# Patient Record
Sex: FEMALE | Race: WHITE | NOT HISPANIC OR LATINO | Employment: FULL TIME | URBAN - METROPOLITAN AREA
[De-identification: names, ages, dates, MRNs, and addresses within clinical notes are randomized per-mention and may not be internally consistent; named-entity substitution may affect disease eponyms.]

---

## 2020-08-05 ENCOUNTER — OFFICE VISIT (OUTPATIENT)
Dept: HEMATOLOGY/ONCOLOGY | Facility: CLINIC | Age: 40
End: 2020-08-05
Payer: COMMERCIAL

## 2020-08-05 ENCOUNTER — LAB VISIT (OUTPATIENT)
Dept: LAB | Facility: HOSPITAL | Age: 40
End: 2020-08-05
Attending: INTERNAL MEDICINE
Payer: COMMERCIAL

## 2020-08-05 VITALS
SYSTOLIC BLOOD PRESSURE: 127 MMHG | OXYGEN SATURATION: 99 % | HEART RATE: 88 BPM | WEIGHT: 236.13 LBS | TEMPERATURE: 98 F | DIASTOLIC BLOOD PRESSURE: 86 MMHG | RESPIRATION RATE: 16 BRPM | BODY MASS INDEX: 43.45 KG/M2 | HEIGHT: 62 IN

## 2020-08-05 DIAGNOSIS — D50.9 IRON DEFICIENCY ANEMIA, UNSPECIFIED IRON DEFICIENCY ANEMIA TYPE: ICD-10-CM

## 2020-08-05 DIAGNOSIS — D50.9 IRON DEFICIENCY ANEMIA, UNSPECIFIED IRON DEFICIENCY ANEMIA TYPE: Primary | ICD-10-CM

## 2020-08-05 DIAGNOSIS — Z98.84 HISTORY OF ROUX-EN-Y GASTRIC BYPASS: ICD-10-CM

## 2020-08-05 PROCEDURE — 99204 PR OFFICE/OUTPT VISIT, NEW, LEVL IV, 45-59 MIN: ICD-10-PCS | Mod: S$GLB,,, | Performed by: INTERNAL MEDICINE

## 2020-08-05 PROCEDURE — 83540 ASSAY OF IRON: CPT

## 2020-08-05 PROCEDURE — 99999 PR PBB SHADOW E&M-NEW PATIENT-LVL IV: CPT | Mod: PBBFAC,,, | Performed by: INTERNAL MEDICINE

## 2020-08-05 PROCEDURE — 3008F BODY MASS INDEX DOCD: CPT | Mod: CPTII,S$GLB,, | Performed by: INTERNAL MEDICINE

## 2020-08-05 PROCEDURE — 99204 OFFICE O/P NEW MOD 45 MIN: CPT | Mod: S$GLB,,, | Performed by: INTERNAL MEDICINE

## 2020-08-05 PROCEDURE — 99999 PR PBB SHADOW E&M-NEW PATIENT-LVL IV: ICD-10-PCS | Mod: PBBFAC,,, | Performed by: INTERNAL MEDICINE

## 2020-08-05 PROCEDURE — 82728 ASSAY OF FERRITIN: CPT

## 2020-08-05 PROCEDURE — 36415 COLL VENOUS BLD VENIPUNCTURE: CPT

## 2020-08-05 PROCEDURE — 3008F PR BODY MASS INDEX (BMI) DOCUMENTED: ICD-10-PCS | Mod: CPTII,S$GLB,, | Performed by: INTERNAL MEDICINE

## 2020-08-05 PROCEDURE — 85025 COMPLETE CBC W/AUTO DIFF WBC: CPT

## 2020-08-05 RX ORDER — BUPROPION HYDROCHLORIDE 300 MG/1
300 TABLET ORAL
COMMUNITY

## 2020-08-05 RX ORDER — SODIUM CHLORIDE 0.9 % (FLUSH) 0.9 %
10 SYRINGE (ML) INJECTION
Status: CANCELLED | OUTPATIENT
Start: 2020-08-19

## 2020-08-05 RX ORDER — FLUOXETINE HYDROCHLORIDE 40 MG/1
40 CAPSULE ORAL
COMMUNITY

## 2020-08-05 RX ORDER — HEPARIN 100 UNIT/ML
500 SYRINGE INTRAVENOUS
Status: CANCELLED | OUTPATIENT
Start: 2020-08-19

## 2020-08-05 RX ORDER — METHOCARBAMOL 750 MG/1
750 TABLET, FILM COATED ORAL
COMMUNITY

## 2020-08-05 RX ORDER — HEPARIN 100 UNIT/ML
500 SYRINGE INTRAVENOUS
Status: CANCELLED | OUTPATIENT
Start: 2020-08-12

## 2020-08-05 RX ORDER — SODIUM CHLORIDE 0.9 % (FLUSH) 0.9 %
10 SYRINGE (ML) INJECTION
Status: CANCELLED | OUTPATIENT
Start: 2020-08-12

## 2020-08-05 RX ORDER — GABAPENTIN 800 MG/1
800 TABLET ORAL
COMMUNITY

## 2020-08-05 NOTE — PROGRESS NOTES
Subjective:      DATE OF VISIT: 8/5/20     ?  Patient ID:?Mariann Arias is a 40 y.o. female.?? MR#: [id number]   ?   REFERRING PROVIDER: Aaareferral Self  No address on file     ? Primary Care Providers:  Primary Doctor No (General)     CHIEF COMPLAINT: ?  Iron deficiency anemia??   ?   HPI    Today the pleasure meeting for the 1st time Ms. Arias, a a 40-year-old woman status post Binu-en-Y gastric bypass surgery in 2005.  Since then she has been on supplements including po iron without improvement and has required IV iron every couple years, last about 4 years ago at outside facility.  She has noted recurrent fatigue and Pica for ice suspecting recurrent iron deficiency anemia.  She denies menorrhagia, melena, hematochezia any dysuria can be hemoptysis, hematemesis, reflux disease.  She does have recurrent kidney stones and chronic back pain status post orthopedic surgery in 2017/2018.    Review of Systems    ?   A comprehensive 14-point review of systems was reviewed with patient and was negative other than as specified above.   ?   PAST MEDICAL HISTORY:   History reviewed. No pertinent past medical history. ?     PAST SURGICAL HISTORY:   History reviewed. No pertinent surgical history.   ?   ALLERGIES:   Allergies as of 08/05/2020    (No Known Allergies)      ?   MEDICATIONS:?   Outpatient Medications Marked as Taking for the 8/5/20 encounter (Office Visit) with Misty Mayers MD   Medication Sig Dispense Refill    buPROPion (WELLBUTRIN XL) 300 MG 24 hr tablet Take 300 mg by mouth.      FLUoxetine 40 MG capsule Take 40 mg by mouth.      gabapentin (NEURONTIN) 800 MG tablet Take 800 mg by mouth.      methocarbamoL (ROBAXIN) 750 MG Tab Take 750 mg by mouth.        ?   SOCIAL HISTORY:?   Social History     Tobacco Use    Smoking status: Not on file   Substance Use Topics    Alcohol use: Not on file      ?      ?   FAMILY HISTORY:   family history is not on file.   ?   Paternal grandmother with ovarian  cancer in 80s.      Objective:      Physical Exam      ?   Vitals:    08/05/20 1549   BP: 127/86   Pulse: 88   Resp: 16   Temp: 98 °F (36.7 °C)      ?   ECOG:?0   General appearance: Generally well appearing, in no acute distress.   Head, eyes, ears, nose, and throat: moist mucous membranes.   Respiratory:  Normal work of breathing  Abdomen:  Obese, nontender  Extremities: Warm, without edema.   Neurologic: Alert and oriented. Grossly normal strength, coordination, and gait.   Skin: No rashes, ecchymoses or petechial lesion.   Psychiatric:  Normal mood and affect.    ?   Laboratory:  ?   Lab Visit on 08/05/2020   Component Date Value Ref Range Status    WBC 08/05/2020 5.28  3.90 - 12.70 K/uL Final    RBC 08/05/2020 4.30  4.00 - 5.40 M/uL Final    Hemoglobin 08/05/2020 8.6* 12.0 - 16.0 g/dL Final    Hematocrit 08/05/2020 30.1* 37.0 - 48.5 % Final    Mean Corpuscular Volume 08/05/2020 70* 82 - 98 fL Final    Mean Corpuscular Hemoglobin 08/05/2020 20.0* 27.0 - 31.0 pg Final    Mean Corpuscular Hemoglobin Conc 08/05/2020 28.6* 32.0 - 36.0 g/dL Final    RDW 08/05/2020 17.6* 11.5 - 14.5 % Final    Platelets 08/05/2020 298  150 - 350 K/uL Final    MPV 08/05/2020 11.0  9.2 - 12.9 fL Final    Immature Granulocytes 08/05/2020 0.6* 0.0 - 0.5 % Final    Gran # (ANC) 08/05/2020 3.2  1.8 - 7.7 K/uL Final    Immature Grans (Abs) 08/05/2020 0.03  0.00 - 0.04 K/uL Final    Lymph # 08/05/2020 1.5  1.0 - 4.8 K/uL Final    Mono # 08/05/2020 0.5  0.3 - 1.0 K/uL Final    Eos # 08/05/2020 0.1  0.0 - 0.5 K/uL Final    Baso # 08/05/2020 0.02  0.00 - 0.20 K/uL Final    nRBC 08/05/2020 0  0 /100 WBC Final    Gran% 08/05/2020 59.9  38.0 - 73.0 % Final    Lymph% 08/05/2020 27.5  18.0 - 48.0 % Final    Mono% 08/05/2020 8.9  4.0 - 15.0 % Final    Eosinophil% 08/05/2020 2.7  0.0 - 8.0 % Final    Basophil% 08/05/2020 0.4  0.0 - 1.9 % Final    Differential Method 08/05/2020 Automated   Final          ?   Assessment/Plan:        1. Iron deficiency anemia, unspecified iron deficiency anemia type    2. History of Binu-en-Y gastric bypass          Plan:     # iron deficiency anemia:  Recurrent iron deficiency anemia requiring IV iron in the setting of gastric bypass surgery 2005 with recurrent symptomatic anemia with fatigue and Pica.  Labs confirm severe microcytic anemia with hemoglobin 8.6, MCV 70.  Iron indices pending. I did recommend repeat IV iron which she tolerated well in the past.  I discussed risks and benefits of IV iron including limited use in early pregnancy (denies risk of pregnancy and defers pregnancy exam due to ongoing oral contraceptive use and not sexually active), and risk of allergy/anaphylaxis.  She is in agreement with proceeding with IV iron at this time.  Given her history of malabsorption in the setting of Binu-en-Y gastric bypass in the absence of other concerning symptoms for GI blood loss, history of IBS or colorectal malignancy history in family or other blood loss to indicate early colonoscopy/EGD but encouraged her to let us know if any concerns for this arise in the future.     Follow-Up:   Labs today  IV iron Feraheme x2 doses 1 week apart  Revisit in 3 months with labs 1 week prior, tele video visit maty

## 2020-08-05 NOTE — PATIENT INSTRUCTIONS
Labs today  Iv iron feraheme x 2 doses 1 week apart  RV in 3 months with 1 week prior televideo please

## 2020-08-06 LAB
BASOPHILS # BLD AUTO: 0.02 K/UL (ref 0–0.2)
BASOPHILS NFR BLD: 0.4 % (ref 0–1.9)
DIFFERENTIAL METHOD: ABNORMAL
EOSINOPHIL # BLD AUTO: 0.1 K/UL (ref 0–0.5)
EOSINOPHIL NFR BLD: 2.7 % (ref 0–8)
ERYTHROCYTE [DISTWIDTH] IN BLOOD BY AUTOMATED COUNT: 17.6 % (ref 11.5–14.5)
FERRITIN SERPL-MCNC: 4 NG/ML (ref 20–300)
HCT VFR BLD AUTO: 30.1 % (ref 37–48.5)
HGB BLD-MCNC: 8.6 G/DL (ref 12–16)
IMM GRANULOCYTES # BLD AUTO: 0.03 K/UL (ref 0–0.04)
IMM GRANULOCYTES NFR BLD AUTO: 0.6 % (ref 0–0.5)
IRON SERPL-MCNC: 13 UG/DL (ref 30–160)
LYMPHOCYTES # BLD AUTO: 1.5 K/UL (ref 1–4.8)
LYMPHOCYTES NFR BLD: 27.5 % (ref 18–48)
MCH RBC QN AUTO: 20 PG (ref 27–31)
MCHC RBC AUTO-ENTMCNC: 28.6 G/DL (ref 32–36)
MCV RBC AUTO: 70 FL (ref 82–98)
MONOCYTES # BLD AUTO: 0.5 K/UL (ref 0.3–1)
MONOCYTES NFR BLD: 8.9 % (ref 4–15)
NEUTROPHILS # BLD AUTO: 3.2 K/UL (ref 1.8–7.7)
NEUTROPHILS NFR BLD: 59.9 % (ref 38–73)
NRBC BLD-RTO: 0 /100 WBC
PLATELET # BLD AUTO: 298 K/UL (ref 150–350)
PMV BLD AUTO: 11 FL (ref 9.2–12.9)
RBC # BLD AUTO: 4.3 M/UL (ref 4–5.4)
SATURATED IRON: 2 % (ref 20–50)
TOTAL IRON BINDING CAPACITY: 558 UG/DL (ref 250–450)
TRANSFERRIN SERPL-MCNC: 377 MG/DL (ref 200–375)
WBC # BLD AUTO: 5.28 K/UL (ref 3.9–12.7)

## 2020-08-12 ENCOUNTER — INFUSION (OUTPATIENT)
Dept: INFUSION THERAPY | Facility: HOSPITAL | Age: 40
End: 2020-08-12
Attending: INTERNAL MEDICINE
Payer: COMMERCIAL

## 2020-08-12 VITALS
RESPIRATION RATE: 16 BRPM | TEMPERATURE: 98 F | OXYGEN SATURATION: 99 % | SYSTOLIC BLOOD PRESSURE: 120 MMHG | DIASTOLIC BLOOD PRESSURE: 70 MMHG | HEART RATE: 70 BPM

## 2020-08-12 DIAGNOSIS — D50.9 IRON DEFICIENCY ANEMIA, UNSPECIFIED IRON DEFICIENCY ANEMIA TYPE: Primary | ICD-10-CM

## 2020-08-12 PROCEDURE — 96374 THER/PROPH/DIAG INJ IV PUSH: CPT

## 2020-08-12 PROCEDURE — 63600175 PHARM REV CODE 636 W HCPCS: Mod: JG | Performed by: INTERNAL MEDICINE

## 2020-08-12 PROCEDURE — 25000003 PHARM REV CODE 250: Performed by: INTERNAL MEDICINE

## 2020-08-12 RX ORDER — SODIUM CHLORIDE 0.9 % (FLUSH) 0.9 %
10 SYRINGE (ML) INJECTION
Status: DISCONTINUED | OUTPATIENT
Start: 2020-08-12 | End: 2020-08-12 | Stop reason: HOSPADM

## 2020-08-12 RX ADMIN — FERUMOXYTOL 510 MG: 510 INJECTION INTRAVENOUS at 01:08

## 2020-08-12 NOTE — DISCHARGE INSTRUCTIONS
[facility]  [address] [facility] [address] [facility] [address]  [address]  [phone] phone     [phone] fax  Hours of Operation: Monday- Friday 8:00am- 5:00pm  After hours phone  [phone]  Hematology / Oncology Physicians on call      EMELINA Victor Dr., Dr., Dr., Dr., NP Sydney Prescott, NP Tyesha Taylor, NP    Please call with any concerns regarding your appointment today.    IRON DEFICIENCY ANEMIA:  Anemia is a condition where the size or number of red blood cells in the body is reduced. Iron is needed in the diet to make red cells. The red blood cells carry oxygen to all parts of the body. Anemia limits the delivery of oxygen to where it is needed. This causes a feeling of being tired and run down. When anemia becomes severe, the skin becomes pale and there is shortness of breath with exertion, headaches, dizziness, drowsiness and fatigue.  The cause of your anemia is lack of iron in your body. This may occur due to blood loss (for example, heavy menstrual periods or bleeding from the stomach or intestines) or a poor diet (not eating enough iron-containing foods), inability to absorb iron from your diet, or pregnancy.  If the blood count is low enough, an IRON SUPPLEMENT will be prescribed. It usually takes about 2-3 months of treatment with iron supplements to correct an anemia. Severe cases of anemia requires a blood transfusion to rapidly correct symptoms and deliver more oxygen to the cells.  HOME CARE:  1) Increase the iron stores in your body by eating foods high in iron content. This is a natural way of building your blood cells back up again. Beef, liver, spinach and other dark green leafy vegetables, whole grain products, beans and nuts are all natural sources of iron.  2) If you are having symptoms of anemia listed above:  -- Do not overexert yourself.  -- Talk to your doctor before flying on an airplane or  travelling to high altitudes.  FOLLOW UP with your doctor in 2 months for a repeat red blood cell count, or as recommended by our staff, to be sure that the anemia has been corrected.  GET PROMPT MEDICAL ATTENTION if any of the following occur or worsen:  -- Shortness of breath or chest pain  -- Dizziness or fainting  -- Vomiting blood or passing red or black-colored stool  © 2000-2011 [facility], [address] [place] [address], [place] [address]. All rights reserved. This information is not intended as a substitute for professional medical care. Always follow your healthcare professional's instructions.    Ferumoxytol Solution for injection  What is this medicine?  FERUMOXYTOL is an iron complex. Iron is used to make healthy red blood cells, which carry oxygen and nutrients throughout the body. This medicine is used to treat iron deficiency anemia in people with chronic kidney disease.  This medicine may be used for other purposes; ask your health care provider or pharmacist if you have questions.  What should I tell my health care provider before I take this medicine?  They need to know if you have any of these conditions:  · anemia not caused by low iron levels  · high levels of iron in the blood  · magnetic resonance imaging (MRI) test scheduled  · an unusual or allergic reaction to iron, other medicines, foods, dyes, or preservatives  · pregnant or trying to get pregnant  · breast-feeding  How should I use this medicine?  This medicine is for infusion into a vein. It is given by a health care professional in a hospital or clinic setting.  Talk to your pediatrician regarding the use of this medicine in children. Special care may be needed.  Overdosage: If you think you've taken too much of this medicine contact a poison control center or emergency room at once.  NOTE: This medicine is only for you. Do not share this medicine with others.  What if I miss a dose?  It is important not to miss your dose. Call your  doctor or health care professional if you are unable to keep an appointment.  What may interact with this medicine?  This medicine may interact with the following medications:  · other iron products  This list may not describe all possible interactions. Give your health care provider a list of all the medicines, herbs, non-prescription drugs, or dietary supplements you use. Also tell them if you smoke, drink alcohol, or use illegal drugs. Some items may interact with your medicine.  What should I watch for while using this medicine?  Visit your doctor or healthcare professional regularly. Tell your doctor or healthcare professional if your symptoms do not start to get better or if they get worse. You may need blood work done while you are taking this medicine.  You may need to follow a special diet. Talk to your doctor. Foods that contain iron include: whole grains/cereals, dried fruits, beans, or peas, leafy green vegetables, and organ meats (liver, kidney).  What side effects may I notice from receiving this medicine?  Side effects that you should report to your doctor or health care professional as soon as possible:  · allergic reactions like skin rash, itching or hives, swelling of the face, lips, or tongue  · breathing problems  · changes in blood pressure  · feeling faint or lightheaded, falls  · fever or chills  · flushing, sweating, or hot feelings  · swelling of the ankles or feet  Side effects that usually do not require medical attention (Report these to your doctor or health care professional if they continue or are bothersome.):  · diarrhea  · headache  · nausea, vomiting  · stomach pain  This list may not describe all possible side effects. Call your doctor for medical advice about side effects. You may report side effects to FDA at 9-084-FDA-4569.  Where should I keep my medicine?  This drug is given in a hospital or clinic and will not be stored at home.  NOTE: This sheet is a summary. It may not  cover all possible information. If you have questions about this medicine, talk to your doctor, pharmacist, or health care provider.  NOTE:This sheet is a summary. It may not cover all possible information. If you have questions about this medicine, talk to your doctor, pharmacist, or health care provider. Copyright© 2011 [employer]

## 2020-08-19 ENCOUNTER — INFUSION (OUTPATIENT)
Dept: INFUSION THERAPY | Facility: HOSPITAL | Age: 40
End: 2020-08-19
Attending: INTERNAL MEDICINE
Payer: COMMERCIAL

## 2020-08-19 VITALS
DIASTOLIC BLOOD PRESSURE: 66 MMHG | OXYGEN SATURATION: 99 % | SYSTOLIC BLOOD PRESSURE: 105 MMHG | TEMPERATURE: 98 F | HEART RATE: 71 BPM | RESPIRATION RATE: 17 BRPM

## 2020-08-19 DIAGNOSIS — D50.9 IRON DEFICIENCY ANEMIA, UNSPECIFIED IRON DEFICIENCY ANEMIA TYPE: Primary | ICD-10-CM

## 2020-08-19 PROCEDURE — 96365 THER/PROPH/DIAG IV INF INIT: CPT

## 2020-08-19 PROCEDURE — 25000003 PHARM REV CODE 250: Performed by: INTERNAL MEDICINE

## 2020-08-19 PROCEDURE — 63600175 PHARM REV CODE 636 W HCPCS: Mod: JG | Performed by: INTERNAL MEDICINE

## 2020-08-19 RX ORDER — CEPHALEXIN 500 MG/1
CAPSULE ORAL
COMMUNITY
Start: 2020-08-11 | End: 2020-10-02

## 2020-08-19 RX ADMIN — FERUMOXYTOL 510 MG: 510 INJECTION INTRAVENOUS at 01:08

## 2020-08-19 NOTE — DISCHARGE INSTRUCTIONS
[facility] [place]  [address] [address] [address]  [address]  [phone] phone     [phone] fax  Hours of Operation: Monday- Friday 8:00am- 5:00pm  After hours phone  [phone]  Hematology / Oncology Physicians on call      EMELINA De León Dr., Dr., Dr., Dr., NP Cheree Bodden, NP Sydney Prescott, NP      Please call with any concerns regarding your appointment today.

## 2020-09-15 ENCOUNTER — OFFICE VISIT (OUTPATIENT)
Dept: FAMILY MEDICINE | Facility: CLINIC | Age: 40
End: 2020-09-15

## 2020-09-15 VITALS
HEIGHT: 62 IN | OXYGEN SATURATION: 99 % | HEART RATE: 82 BPM | TEMPERATURE: 99 F | SYSTOLIC BLOOD PRESSURE: 124 MMHG | BODY MASS INDEX: 41.24 KG/M2 | DIASTOLIC BLOOD PRESSURE: 70 MMHG | WEIGHT: 224.13 LBS

## 2020-09-15 DIAGNOSIS — R10.9 RIGHT FLANK PAIN: Primary | ICD-10-CM

## 2020-09-15 DIAGNOSIS — Z98.84 STATUS POST GASTRIC BYPASS FOR OBESITY: ICD-10-CM

## 2020-09-15 DIAGNOSIS — D50.9 IRON DEFICIENCY ANEMIA, UNSPECIFIED IRON DEFICIENCY ANEMIA TYPE: ICD-10-CM

## 2020-09-15 LAB
BILIRUB SERPL-MCNC: 2 MG/DL
BLOOD URINE, POC: 500
CLARITY, POC UA: ABNORMAL
COLOR, POC UA: YELLOW
GLUCOSE UR QL STRIP: NORMAL
KETONES UR QL STRIP: NEGATIVE
LEUKOCYTE ESTERASE URINE, POC: 2
NITRITE, POC UA: NEGATIVE
PH, POC UA: 5
PROTEIN, POC: ABNORMAL
SPECIFIC GRAVITY, POC UA: 1.02
UROBILINOGEN, POC UA: NORMAL

## 2020-09-15 PROCEDURE — 99204 OFFICE O/P NEW MOD 45 MIN: CPT | Mod: S$PBB,,, | Performed by: FAMILY MEDICINE

## 2020-09-15 PROCEDURE — 99204 PR OFFICE/OUTPT VISIT, NEW, LEVL IV, 45-59 MIN: ICD-10-PCS | Mod: S$PBB,,, | Performed by: FAMILY MEDICINE

## 2020-09-15 PROCEDURE — 96372 THER/PROPH/DIAG INJ SC/IM: CPT | Mod: PBBFAC,PO

## 2020-09-15 PROCEDURE — 99999 PR PBB SHADOW E&M-EST. PATIENT-LVL IV: CPT | Mod: PBBFAC,,, | Performed by: FAMILY MEDICINE

## 2020-09-15 PROCEDURE — 99214 OFFICE O/P EST MOD 30 MIN: CPT | Mod: PBBFAC,PO,25 | Performed by: FAMILY MEDICINE

## 2020-09-15 PROCEDURE — 81002 URINALYSIS NONAUTO W/O SCOPE: CPT | Mod: PBBFAC,PO | Performed by: FAMILY MEDICINE

## 2020-09-15 PROCEDURE — 99999 PR PBB SHADOW E&M-EST. PATIENT-LVL IV: ICD-10-PCS | Mod: PBBFAC,,, | Performed by: FAMILY MEDICINE

## 2020-09-15 RX ORDER — TAMSULOSIN HYDROCHLORIDE 0.4 MG/1
0.4 CAPSULE ORAL DAILY
Qty: 30 CAPSULE | Refills: 0 | Status: SHIPPED | OUTPATIENT
Start: 2020-09-15 | End: 2020-10-15

## 2020-09-15 RX ORDER — ONDANSETRON 4 MG/1
4 TABLET, ORALLY DISINTEGRATING ORAL EVERY 6 HOURS PRN
Qty: 20 TABLET | Refills: 0 | Status: SHIPPED | OUTPATIENT
Start: 2020-09-15 | End: 2020-10-02

## 2020-09-15 RX ORDER — HYDROCODONE BITARTRATE AND ACETAMINOPHEN 7.5; 325 MG/1; MG/1
1 TABLET ORAL EVERY 6 HOURS PRN
Qty: 12 TABLET | Refills: 0 | Status: SHIPPED | OUTPATIENT
Start: 2020-09-15 | End: 2020-10-02

## 2020-09-15 RX ORDER — KETOROLAC TROMETHAMINE 30 MG/ML
30 INJECTION, SOLUTION INTRAMUSCULAR; INTRAVENOUS ONCE
Status: COMPLETED | OUTPATIENT
Start: 2020-09-15 | End: 2020-09-15

## 2020-09-15 RX ADMIN — KETOROLAC TROMETHAMINE 30 MG: 60 INJECTION, SOLUTION INTRAMUSCULAR at 04:09

## 2020-09-15 NOTE — PROGRESS NOTES
Subjective:       Patient ID: Mariann Arias is a 40 y.o. female.    Chief Complaint: Nephrolithiasis (poss)      HPI Comments:       Current Outpatient Medications:     buPROPion (WELLBUTRIN XL) 300 MG 24 hr tablet, Take 300 mg by mouth., Disp: , Rfl:     cephALEXin (KEFLEX) 500 MG capsule, TK 2 CS PO BID, Disp: , Rfl:     FLUoxetine 40 MG capsule, Take 40 mg by mouth., Disp: , Rfl:     gabapentin (NEURONTIN) 800 MG tablet, Take 800 mg by mouth., Disp: , Rfl:     HYDROcodone-acetaminophen (NORCO) 7.5-325 mg per tablet, Take 1 tablet by mouth every 6 (six) hours as needed for Pain., Disp: 12 tablet, Rfl: 0    methocarbamoL (ROBAXIN) 750 MG Tab, Take 750 mg by mouth., Disp: , Rfl:     ondansetron (ZOFRAN-ODT) 4 MG TbDL, Take 1 tablet (4 mg total) by mouth every 6 (six) hours as needed., Disp: 20 tablet, Rfl: 0    tamsulosin (FLOMAX) 0.4 mg Cap, Take 1 capsule (0.4 mg total) by mouth once daily., Disp: 30 capsule, Rfl: 0  No current facility-administered medications for this visit.       This my 1st time seeing this patient.  She has a history of recurrent stones over the last several years.  Recently moved here from [place] because of a job.  Has no insurance.  Never had lithotripsy of surgery.  Stent x1.  Generally the past spontaneously.  In the past has been helped by IM Toradol and Flomax.  Also uses Suboxone because of a dependency on hydrocodone back when she had a back injury and surgery.  Has been using this as well as ibuprofen and heat since onset yesterday.  No blood in urine.  Some chills.  Decreased urine volume since yesterday pain still is persistent in the flank area    All stones have been calcium oxalate.  Tried dietary approaches in the past to no avail    Review of Systems   Constitutional: Positive for chills. Negative for activity change, appetite change and fever.   HENT: Negative for sore throat.    Respiratory: Negative for cough and shortness of breath.    Cardiovascular: Negative for  "chest pain.   Gastrointestinal: Positive for nausea. Negative for abdominal pain and diarrhea.   Genitourinary: Positive for decreased urine volume, difficulty urinating and flank pain. Negative for dysuria and hematuria.   Musculoskeletal: Positive for back pain. Negative for arthralgias and myalgias.   Neurological: Negative for dizziness and headaches.       Objective:      Vitals:    09/15/20 1523   BP: 124/70   Pulse: 82   Temp: 98.6 °F (37 °C)   TempSrc: Temporal   SpO2: 99%   Weight: 101.6 kg (224 lb 1.6 oz)   Height: 5' 2" (1.575 m)   PainSc:   3   PainLoc: Rib Cage     Physical Exam  Vitals signs and nursing note reviewed.   Constitutional:       General: She is not in acute distress.     Appearance: She is well-developed. She is not diaphoretic.   HENT:      Head: Normocephalic.   Neck:      Musculoskeletal: Neck supple.      Thyroid: No thyromegaly.   Cardiovascular:      Rate and Rhythm: Normal rate and regular rhythm.      Heart sounds: Normal heart sounds. No murmur.   Pulmonary:      Effort: Pulmonary effort is normal.      Breath sounds: Normal breath sounds. No wheezing or rales.   Abdominal:      General: There is no distension.      Palpations: Abdomen is soft. There is no hepatomegaly or splenomegaly.      Tenderness: There is no right CVA tenderness or left CVA tenderness.       Lymphadenopathy:      Cervical: No cervical adenopathy.   Skin:     General: Skin is warm and dry.   Neurological:      Mental Status: She is alert and oriented to person, place, and time.   Psychiatric:         Behavior: Behavior normal.         Thought Content: Thought content normal.         Judgment: Judgment normal.         Assessment:       1. Right flank pain    2. Status post gastric bypass for obesity    3. Iron deficiency anemia, unspecified iron deficiency anemia type        Plan:   Right flank pain  Comments:  Urinalysis consistent with nephrolithiasis.  Toradol IM 30 mg.  Very limited hydrocodone #12.  " Zofran.  Flomax.  Oral hydration  Orders:  -     Urine culture; Future; Expected date: 09/15/2020  -     POCT URINE DIPSTICK WITHOUT MICROSCOPE  -     ketorolac injection 30 mg    Status post gastric bypass for obesity    Iron deficiency anemia, unspecified iron deficiency anemia type  Comments:  Likely secondary to #2  On infusions    Other orders  -     tamsulosin (FLOMAX) 0.4 mg Cap; Take 1 capsule (0.4 mg total) by mouth once daily.  Dispense: 30 capsule; Refill: 0  -     HYDROcodone-acetaminophen (NORCO) 7.5-325 mg per tablet; Take 1 tablet by mouth every 6 (six) hours as needed for Pain.  Dispense: 12 tablet; Refill: 0  -     ondansetron (ZOFRAN-ODT) 4 MG TbDL; Take 1 tablet (4 mg total) by mouth every 6 (six) hours as needed.  Dispense: 20 tablet; Refill: 0

## 2020-09-18 ENCOUNTER — PATIENT MESSAGE (OUTPATIENT)
Dept: FAMILY MEDICINE | Facility: CLINIC | Age: 40
End: 2020-09-18

## 2020-09-18 ENCOUNTER — TELEPHONE (OUTPATIENT)
Dept: FAMILY MEDICINE | Facility: CLINIC | Age: 40
End: 2020-09-18

## 2020-09-18 DIAGNOSIS — Z87.442 HISTORY OF KIDNEY STONES: Primary | ICD-10-CM

## 2020-09-18 NOTE — TELEPHONE ENCOUNTER
----- Message from Mony Gonzalez sent at 9/18/2020  8:52 AM CDT -----  Contact: SELF/[phone]  Would like to consult with nurse regarding a kidney stone. Please call back at [phone]. Thanks/ar

## 2020-09-23 ENCOUNTER — TELEPHONE (OUTPATIENT)
Dept: FAMILY MEDICINE | Facility: CLINIC | Age: 40
End: 2020-09-23

## 2020-09-23 NOTE — TELEPHONE ENCOUNTER
----- Message from Monserrat Chavez sent at 9/23/2020  9:52 AM CDT -----  Contact: pt  Pt requesting a call back to know if she can get a work excuse for Monday and Tuesday. She also stated that she passed another kidney stone. Please call pt back at [phone]

## 2020-09-23 NOTE — TELEPHONE ENCOUNTER
S/w pt. Pt stated that she passed another kidney stone Monday and she was unable to got to work on Monday and yesterday but she did return to work today. Pt is requesting an excuse to be emailed to her at [email or web address]. informed that I will email excuse shortly. Pt verbalized understanding. Excuse emailed.

## 2020-09-23 NOTE — LETTER
September 23, 2020    Mariann Arias  [address]  [address]  [facility] [address]             [facility]  Family Medicine  [address] [facility] [address]  Phone: [phone]  Fax: [phone]   September 23, 2020     Patient: Mariann Arias   YOB: 1980   Date of Visit: 9/23/2020       To Whom it May Concern:    Please excuse Mariann Arias from work on 9/21/2020 and 9/22/2020. She may return to work on 9/23/2020.    Please excuse her from any classes or work missed.    If you have any questions or concerns, please don't hesitate to call.    Sincerely,         Car Alvarez MD/Dania Rasheed LPN

## 2020-10-02 ENCOUNTER — OFFICE VISIT (OUTPATIENT)
Dept: OBSTETRICS AND GYNECOLOGY | Facility: CLINIC | Age: 40
End: 2020-10-02
Payer: COMMERCIAL

## 2020-10-02 VITALS
WEIGHT: 223.75 LBS | BODY MASS INDEX: 40.93 KG/M2 | SYSTOLIC BLOOD PRESSURE: 122 MMHG | DIASTOLIC BLOOD PRESSURE: 84 MMHG

## 2020-10-02 DIAGNOSIS — Z01.419 PAPANICOLAOU SMEAR, AS PART OF ROUTINE GYNECOLOGICAL EXAMINATION: ICD-10-CM

## 2020-10-02 DIAGNOSIS — Z00.00 PREVENTATIVE HEALTH CARE: Primary | ICD-10-CM

## 2020-10-02 PROCEDURE — 99999 PR PBB SHADOW E&M-EST. PATIENT-LVL III: CPT | Mod: PBBFAC,,, | Performed by: NURSE PRACTITIONER

## 2020-10-02 PROCEDURE — 88175 CYTOPATH C/V AUTO FLUID REDO: CPT

## 2020-10-02 PROCEDURE — 99999 PR PBB SHADOW E&M-EST. PATIENT-LVL III: ICD-10-PCS | Mod: PBBFAC,,, | Performed by: NURSE PRACTITIONER

## 2020-10-02 PROCEDURE — 99385 PREV VISIT NEW AGE 18-39: CPT | Mod: S$GLB,,, | Performed by: NURSE PRACTITIONER

## 2020-10-02 PROCEDURE — 87624 HPV HI-RISK TYP POOLED RSLT: CPT

## 2020-10-02 PROCEDURE — 99385 PR PREVENTIVE VISIT,NEW,18-39: ICD-10-PCS | Mod: S$GLB,,, | Performed by: NURSE PRACTITIONER

## 2020-10-02 RX ORDER — IBUPROFEN 800 MG/1
800 TABLET ORAL 3 TIMES DAILY
COMMUNITY

## 2020-10-02 RX ORDER — NORETHINDRONE ACETATE AND ETHINYL ESTRADIOL AND FERROUS FUMARATE 1MG-20(24)
KIT ORAL
Qty: 30 TABLET | Refills: 12 | Status: SHIPPED | OUTPATIENT
Start: 2020-10-02 | End: 2021-10-21 | Stop reason: SDUPTHER

## 2020-10-02 RX ORDER — NORETHINDRONE ACETATE AND ETHINYL ESTRADIOL AND FERROUS FUMARATE 1MG-20(24)
KIT ORAL
COMMUNITY
Start: 2020-08-19 | End: 2020-10-02 | Stop reason: SDUPTHER

## 2020-10-02 NOTE — PROGRESS NOTES
CC: Well woman exam    Mariann Arias is a 40 y.o. female  presents for well woman exam.  LMP: Only take active OCP. No issues, problems, or complaints. Not sexually active. Last pap exam, per patient , was negative. No pelvic pain.     Past Medical History:   Diagnosis Date    Anemia     Kidney stones      Past Surgical History:   Procedure Laterality Date    BACK SURGERY      L4-S1 with a cage        Social History     Socioeconomic History    Marital status: Single     Spouse name: Not on file    Number of children: Not on file    Years of education: Not on file    Highest education level: Not on file   Occupational History    Not on file   Social Needs    Financial resource strain: Not on file    Food insecurity     Worry: Not on file     Inability: Not on file    Transportation needs     Medical: Not on file     Non-medical: Not on file   Tobacco Use    Smoking status: Never Smoker    Smokeless tobacco: Never Used   Substance and Sexual Activity    Alcohol use: Not Currently    Drug use: Not on file    Sexual activity: Not Currently     Partners: Male   Lifestyle    Physical activity     Days per week: Not on file     Minutes per session: Not on file    Stress: Not on file   Relationships    Social connections     Talks on phone: Not on file     Gets together: Not on file     Attends Denominational service: Not on file     Active member of club or organization: Not on file     Attends meetings of clubs or organizations: Not on file     Relationship status: Not on file   Other Topics Concern    Not on file   Social History Narrative    Not on file     Family History   Problem Relation Age of Onset    Ovarian cancer Paternal Grandmother     Diabetes Maternal Grandmother     Hypertension Maternal Grandmother     Hypertension Maternal Grandfather     Diabetes Father     Hypertension Father     Anemia Mother      OB History        0    Para   0    Term   0       0    AB    0    Living   0       SAB   0    TAB   0    Ectopic   0    Multiple   0    Live Births   0                 /84   Wt 101.5 kg (223 lb 12.3 oz)   BMI 40.93 kg/m²       ROS:  GENERAL: Denies weight gain or weight loss. Feeling well overall.   SKIN: Denies rash or lesions.   HEAD: Denies head injury or headache.   NODES: Denies enlarged lymph nodes.   CHEST: Denies chest pain or shortness of breath.   CARDIOVASCULAR: Denies palpitations or left sided chest pain.   ABDOMEN: No abdominal pain, constipation, diarrhea, nausea, vomiting or rectal bleeding.   URINARY: No frequency, dysuria, hematuria, or burning on urination.  REPRODUCTIVE: See HPI.   BREASTS: The patient performs breast self-examination and denies pain, lumps, or nipple discharge.   HEMATOLOGIC: No easy bruisability or excessive bleeding.   MUSCULOSKELETAL: Denies joint pain or swelling.   NEUROLOGIC: Denies syncope or weakness.   PSYCHIATRIC: Denies depression, anxiety or mood swings.    PHYSICAL EXAM:  APPEARANCE: Obese female, in no acute distress.  AFFECT: WNL, alert and oriented x 3  SKIN: No acne or hirsutism  NECK: Neck symmetric without masses or thyromegaly  NODES: No inguinal, cervical, axillary, or femoral lymph node enlargement  CHEST: Good respiratory effect  ABDOMEN: Soft.  No tenderness or masses.  No hepatosplenomegaly.  No hernias.  BREASTS: Symmetrical, no skin changes or visible lesions.  No palpable masses, nipple discharge bilaterally.  PELVIC: Normal external genitalia without lesions.  Normal hair distribution.  Adequate perineal body, normal urethral meatus.  Vagina moist and well rugated without lesions or discharge.  Cervix pink, without lesions, discharge or tenderness. Bimanual exam shows uterus to be normal size, regular, mobile and nontender.  Adnexa without masses or tenderness.    EXTREMITIES: No edema.    1. Preventative health care  Liquid-Based Pap Smear, Screening    HPV High Risk Genotypes, PCR   2. Papanicolaou  smear, as part of routine gynecological examination  Liquid-Based Pap Smear, Screening    HPV High Risk Genotypes, PCR     PLAN:  Pap exam  Refill OCP  Patient was counseled today on A.C.S. Pap guidelines and recommendations for yearly pelvic exams, mammograms and monthly self breast exams; to see her PCP for other health maintenance.

## 2020-10-09 ENCOUNTER — PATIENT MESSAGE (OUTPATIENT)
Dept: OBSTETRICS AND GYNECOLOGY | Facility: CLINIC | Age: 40
End: 2020-10-09

## 2020-10-09 LAB
HPV HR 12 DNA SPEC QL NAA+PROBE: NEGATIVE
HPV16 AG SPEC QL: NEGATIVE
HPV18 DNA SPEC QL NAA+PROBE: NEGATIVE

## 2020-10-14 NOTE — PROGRESS NOTES
Subjective:       Patient ID: Mariann Arias is a 40 y.o. female.    Chief Complaint:   Encounter Diagnoses   Name Primary?    History of kidney stones     Abdominal pain, unspecified abdominal location      Mariann Arias is a 40 y.o. female that presents today as a referral from Dr. Alvarez for history of kidney stones.  Patient states that she has a long history of kidney stones the 1st being when she was 20 years old and has passed approximately 20 stones during that time.  She states that she has had calcium oxalate stones and is thankfully never had to have surgery for her stones.  Most recently over the last 2 months she has passed a 2 stones, and is curious if there are more that she is going to have to pass him like some imaging.  She states that the majority of her stones have been on her right side however she has passed some stones on her left as well she has previously undergone a 24 hr urine however she thinks that was about 10 years ago and is unsure of the results.  She has never been on potassium citrate or any other medication for stone prevention.  Overall she voids with a good stream and empties her bladder well, denying any gross hematuria or issues with urinary tract infections.      Review of Systems   Constitutional: Negative for chills and fever.   HENT: Negative for sinus pressure and sinus pain.    Eyes: Negative for visual disturbance.   Respiratory: Negative for shortness of breath.    Cardiovascular: Negative for chest pain.   Gastrointestinal: Negative for abdominal pain.   Musculoskeletal: Negative for back pain.   Skin: Negative for rash.   Neurological: Negative for dizziness and light-headedness.   Hematological: Does not bruise/bleed easily.   Psychiatric/Behavioral: Negative for confusion.     Past Medical History:   Diagnosis Date    Anemia     Kidney stones      Past Surgical History:   Procedure Laterality Date    BACK SURGERY      GASTRIC BYPASS  2005    L4-S1 with a  cage        Family History   Problem Relation Age of Onset    Ovarian cancer Paternal Grandmother     Diabetes Maternal Grandmother     Hypertension Maternal Grandmother     Hypertension Maternal Grandfather     Diabetes Father     Hypertension Father     Anemia Mother        Social History     Tobacco Use    Smoking status: Never Smoker    Smokeless tobacco: Never Used   Substance Use Topics    Alcohol use: Not Currently    Drug use: Not on file        Medications    Current Outpatient Medications:     buprenorphine-naloxone (SUBOXONE) 8-2 mg Film, DISSOLVE 2 FILMS UNDER THE TONGUE QD, Disp: , Rfl:     buPROPion (WELLBUTRIN XL) 300 MG 24 hr tablet, Take 300 mg by mouth., Disp: , Rfl:     DULoxetine (CYMBALTA) 30 MG capsule, , Disp: , Rfl:     FLUoxetine 40 MG capsule, Take 40 mg by mouth., Disp: , Rfl:     gabapentin (NEURONTIN) 800 MG tablet, Take 800 mg by mouth., Disp: , Rfl:     ibuprofen (ADVIL,MOTRIN) 800 MG tablet, Take 800 mg by mouth 3 (three) times daily., Disp: , Rfl:     JUNEL FE 24 1 mg-20 mcg (24)/75 mg (4) per tablet, TK 1 T PO QD, Disp: 30 tablet, Rfl: 12    methocarbamoL (ROBAXIN) 750 MG Tab, Take 750 mg by mouth., Disp: , Rfl:      Objective:     Vitals:    10/15/20 1555   BP: 122/82   Pulse: 84       Physical Exam   Vitals reviewed.  Constitutional: She is oriented to person, place, and time. No distress.   HENT:   Head: Normocephalic and atraumatic.   Right Ear: External ear normal.   Left Ear: External ear normal.   Eyes: Conjunctivae are normal.   Neck: Normal range of motion.   Cardiovascular: Normal rate.    Pulmonary/Chest: Effort normal.   Abdominal: Soft. Normal appearance. She exhibits no distension. There is no abdominal tenderness.   Musculoskeletal: Normal range of motion.      Thoracic back: She exhibits no tenderness.   Neurological: She is alert and oriented to person, place, and time.   Skin: Skin is warm and dry.     Psychiatric: Mood normal.        RADIOLOGY:  No recent relevant imaging available for review.    LABS:  I personally reviewed the following lab values:  Lab Results   Component Value Date    WBC 5.28 08/05/2020    HGB 8.6 (L) 08/05/2020    HCT 30.1 (L) 08/05/2020     08/05/2020       Assessment:           Problem List Items Addressed This Visit        Renal/    History of kidney stones    Current Assessment & Plan     Per the patient's report she has a very long history of passing kidney stones, and though it sounds as if she has a been properly managed thus far, she has continued to form and pass stones.  She does note that she has continued to have some pain in her back that she is concerned are stones that are trying to pass, so we will obtain a CT stone protocol to evaluate her current stone burden, and if she is amenable we may schedule ureteroscopy to clear out her current stone burden and get her stone free.  In addition I will order a BMP and PTH as well as a repeat 24 hr urine.  We will have the patient follow up in about 2 months for review of these.         Relevant Orders    Basic Metabolic Panel    PTH, intact      Other Visit Diagnoses     Abdominal pain, unspecified abdominal location        Relevant Orders    CT Renal Stone Study ABD Pelvis WO        Plan:   History of kidney stones  -     Ambulatory referral/consult to Urology  -     Basic Metabolic Panel; Future; Expected date: 10/15/2020  -     PTH, intact; Future; Expected date: 10/15/2020    Abdominal pain, unspecified abdominal location  -     CT Renal Stone Study ABD Pelvis WO; Future; Expected date: 10/29/2020         Thank you for allowing me the opportunity to participate in this patient's care.

## 2020-10-15 ENCOUNTER — OFFICE VISIT (OUTPATIENT)
Dept: UROLOGY | Facility: CLINIC | Age: 40
End: 2020-10-15
Payer: COMMERCIAL

## 2020-10-15 VITALS
BODY MASS INDEX: 39.63 KG/M2 | WEIGHT: 215.38 LBS | DIASTOLIC BLOOD PRESSURE: 82 MMHG | HEIGHT: 62 IN | SYSTOLIC BLOOD PRESSURE: 122 MMHG | HEART RATE: 84 BPM

## 2020-10-15 DIAGNOSIS — R10.9 ABDOMINAL PAIN, UNSPECIFIED ABDOMINAL LOCATION: ICD-10-CM

## 2020-10-15 DIAGNOSIS — Z87.442 HISTORY OF KIDNEY STONES: ICD-10-CM

## 2020-10-15 PROCEDURE — 99205 OFFICE O/P NEW HI 60 MIN: CPT | Mod: S$GLB,,, | Performed by: UROLOGY

## 2020-10-15 PROCEDURE — 99999 PR PBB SHADOW E&M-EST. PATIENT-LVL IV: CPT | Mod: PBBFAC,,, | Performed by: UROLOGY

## 2020-10-15 PROCEDURE — 3008F PR BODY MASS INDEX (BMI) DOCUMENTED: ICD-10-PCS | Mod: CPTII,S$GLB,, | Performed by: UROLOGY

## 2020-10-15 PROCEDURE — 99205 PR OFFICE/OUTPT VISIT, NEW, LEVL V, 60-74 MIN: ICD-10-PCS | Mod: S$GLB,,, | Performed by: UROLOGY

## 2020-10-15 PROCEDURE — 99999 PR PBB SHADOW E&M-EST. PATIENT-LVL IV: ICD-10-PCS | Mod: PBBFAC,,, | Performed by: UROLOGY

## 2020-10-15 PROCEDURE — 3008F BODY MASS INDEX DOCD: CPT | Mod: CPTII,S$GLB,, | Performed by: UROLOGY

## 2020-10-15 RX ORDER — BUPRENORPHINE AND NALOXONE 8; 2 MG/1; MG/1
FILM, SOLUBLE BUCCAL; SUBLINGUAL
COMMUNITY
Start: 2020-10-06

## 2020-10-15 RX ORDER — DULOXETIN HYDROCHLORIDE 30 MG/1
CAPSULE, DELAYED RELEASE ORAL
COMMUNITY
Start: 2020-10-13 | End: 2021-10-21

## 2020-10-15 NOTE — ASSESSMENT & PLAN NOTE
Per the patient's report she has a very long history of passing kidney stones, and though it sounds as if she has a been properly managed thus far, she has continued to form and pass stones.  She does note that she has continued to have some pain in her back that she is concerned are stones that are trying to pass, so we will obtain a CT stone protocol to evaluate her current stone burden, and if she is amenable we may schedule ureteroscopy to clear out her current stone burden and get her stone free.  In addition I will order a BMP and PTH as well as a repeat 24 hr urine.  We will have the patient follow up in about 2 months for review of these.

## 2020-10-15 NOTE — LETTER
October 15, 2020      Car Alvarez MD  [address] [place] [id number]           [facility]  [address] [facility] [address]  Phone: [phone]  Fax: [phone]          Patient: Mariann Arias   MR Number: [id number]   YOB: 1980   Date of Visit: 10/15/2020       Dear Dr. Car Alvarez:    Thank you for referring Mariann Arias to me for evaluation. Attached you will find relevant portions of my assessment and plan of care.    If you have questions, please do not hesitate to call me. I look forward to following Mariann Arias along with you.    Sincerely,    Flynn Madrid MD    Enclosure  CC:  No Recipients    If you would like to receive this communication electronically, please contact [email or web address] or [phone] to request more information on [employer] Link access.    For providers and/or their staff who would like to refer a patient to [facility], please contact us through our one-stop-shop provider referral line, [facility], at [phone].    If you feel you have received this communication in error or would no longer like to receive these types of communications, please e-mail [email or web address]

## 2020-10-25 LAB
FINAL PATHOLOGIC DIAGNOSIS: NORMAL
Lab: NORMAL

## 2020-10-26 ENCOUNTER — TELEPHONE (OUTPATIENT)
Dept: UROLOGY | Facility: CLINIC | Age: 40
End: 2020-10-26

## 2020-10-26 NOTE — TELEPHONE ENCOUNTER
----- Message from Ruby Sims sent at 10/26/2020  8:56 AM CDT -----  Regarding: pt  Contact: pt [phone]  Pt called in she needs to change the location and time of ct scan. She would like to speak with a nurse. Pt can be reached at [phone]

## 2020-10-26 NOTE — TELEPHONE ENCOUNTER
----- Message from Kyler Bell sent at 10/26/2020  2:46 PM CDT -----  Type:  Patient Returning Call    Who Called: GOOD CARMICHAEL   Who Left Message for Patient:  nurse   Does the patient know what this is regarding?:    Would the patient rather a call back or a response via [facility]?   Call    Best Call Back Number: [phone] (home)    Additional Information:

## 2021-10-21 ENCOUNTER — OFFICE VISIT (OUTPATIENT)
Dept: OBSTETRICS AND GYNECOLOGY | Facility: CLINIC | Age: 41
End: 2021-10-21
Payer: COMMERCIAL

## 2021-10-21 VITALS
WEIGHT: 226.88 LBS | DIASTOLIC BLOOD PRESSURE: 76 MMHG | SYSTOLIC BLOOD PRESSURE: 114 MMHG | HEIGHT: 62 IN | BODY MASS INDEX: 41.75 KG/M2

## 2021-10-21 DIAGNOSIS — Z01.419 WELL WOMAN EXAM WITH ROUTINE GYNECOLOGICAL EXAM: Primary | ICD-10-CM

## 2021-10-21 DIAGNOSIS — Z30.41 ENCOUNTER FOR SURVEILLANCE OF CONTRACEPTIVE PILLS: ICD-10-CM

## 2021-10-21 PROCEDURE — 3008F PR BODY MASS INDEX (BMI) DOCUMENTED: ICD-10-PCS | Mod: CPTII,S$GLB,, | Performed by: NURSE PRACTITIONER

## 2021-10-21 PROCEDURE — 1160F RVW MEDS BY RX/DR IN RCRD: CPT | Mod: CPTII,S$GLB,, | Performed by: NURSE PRACTITIONER

## 2021-10-21 PROCEDURE — 3074F PR MOST RECENT SYSTOLIC BLOOD PRESSURE < 130 MM HG: ICD-10-PCS | Mod: CPTII,S$GLB,, | Performed by: NURSE PRACTITIONER

## 2021-10-21 PROCEDURE — 3078F PR MOST RECENT DIASTOLIC BLOOD PRESSURE < 80 MM HG: ICD-10-PCS | Mod: CPTII,S$GLB,, | Performed by: NURSE PRACTITIONER

## 2021-10-21 PROCEDURE — 3074F SYST BP LT 130 MM HG: CPT | Mod: CPTII,S$GLB,, | Performed by: NURSE PRACTITIONER

## 2021-10-21 PROCEDURE — 3078F DIAST BP <80 MM HG: CPT | Mod: CPTII,S$GLB,, | Performed by: NURSE PRACTITIONER

## 2021-10-21 PROCEDURE — 1159F MED LIST DOCD IN RCRD: CPT | Mod: CPTII,S$GLB,, | Performed by: NURSE PRACTITIONER

## 2021-10-21 PROCEDURE — 3008F BODY MASS INDEX DOCD: CPT | Mod: CPTII,S$GLB,, | Performed by: NURSE PRACTITIONER

## 2021-10-21 PROCEDURE — 1159F PR MEDICATION LIST DOCUMENTED IN MEDICAL RECORD: ICD-10-PCS | Mod: CPTII,S$GLB,, | Performed by: NURSE PRACTITIONER

## 2021-10-21 PROCEDURE — 1160F PR REVIEW ALL MEDS BY PRESCRIBER/CLIN PHARMACIST DOCUMENTED: ICD-10-PCS | Mod: CPTII,S$GLB,, | Performed by: NURSE PRACTITIONER

## 2021-10-21 PROCEDURE — 99999 PR PBB SHADOW E&M-EST. PATIENT-LVL III: CPT | Mod: PBBFAC,,, | Performed by: NURSE PRACTITIONER

## 2021-10-21 PROCEDURE — 99395 PR PREVENTIVE VISIT,EST,18-39: ICD-10-PCS | Mod: S$GLB,,, | Performed by: NURSE PRACTITIONER

## 2021-10-21 PROCEDURE — 99999 PR PBB SHADOW E&M-EST. PATIENT-LVL III: ICD-10-PCS | Mod: PBBFAC,,, | Performed by: NURSE PRACTITIONER

## 2021-10-21 PROCEDURE — 99395 PREV VISIT EST AGE 18-39: CPT | Mod: S$GLB,,, | Performed by: NURSE PRACTITIONER

## 2021-10-21 RX ORDER — FLUOXETINE HYDROCHLORIDE 20 MG/1
20 CAPSULE ORAL DAILY
COMMUNITY
Start: 2021-10-20 | End: 2021-10-21

## 2021-10-21 RX ORDER — NORETHINDRONE ACETATE AND ETHINYL ESTRADIOL AND FERROUS FUMARATE 1MG-20(24)
KIT ORAL
Qty: 30 TABLET | Refills: 12 | Status: SHIPPED | OUTPATIENT
Start: 2021-10-21 | End: 2023-01-03 | Stop reason: SDUPTHER

## 2023-01-03 DIAGNOSIS — Z30.41 ENCOUNTER FOR SURVEILLANCE OF CONTRACEPTIVE PILLS: ICD-10-CM

## 2023-01-03 RX ORDER — NORETHINDRONE ACETATE AND ETHINYL ESTRADIOL AND FERROUS FUMARATE 1MG-20(24)
KIT ORAL
Qty: 30 TABLET | Refills: 1 | Status: SHIPPED | OUTPATIENT
Start: 2023-01-03 | End: 2023-02-22 | Stop reason: SDUPTHER

## 2023-02-22 ENCOUNTER — OFFICE VISIT (OUTPATIENT)
Dept: OBSTETRICS AND GYNECOLOGY | Facility: CLINIC | Age: 43
End: 2023-02-22
Payer: COMMERCIAL

## 2023-02-22 VITALS
SYSTOLIC BLOOD PRESSURE: 124 MMHG | DIASTOLIC BLOOD PRESSURE: 90 MMHG | WEIGHT: 215.63 LBS | BODY MASS INDEX: 39.44 KG/M2

## 2023-02-22 DIAGNOSIS — Z01.419 WELL WOMAN EXAM WITH ROUTINE GYNECOLOGICAL EXAM: Primary | ICD-10-CM

## 2023-02-22 DIAGNOSIS — Z30.41 ENCOUNTER FOR SURVEILLANCE OF CONTRACEPTIVE PILLS: ICD-10-CM

## 2023-02-22 DIAGNOSIS — Z12.31 ENCOUNTER FOR SCREENING MAMMOGRAM FOR MALIGNANT NEOPLASM OF BREAST: ICD-10-CM

## 2023-02-22 PROCEDURE — 99396 PR PREVENTIVE VISIT,EST,40-64: ICD-10-PCS | Mod: S$GLB,,, | Performed by: NURSE PRACTITIONER

## 2023-02-22 PROCEDURE — 3008F PR BODY MASS INDEX (BMI) DOCUMENTED: ICD-10-PCS | Mod: CPTII,S$GLB,, | Performed by: NURSE PRACTITIONER

## 2023-02-22 PROCEDURE — 99396 PREV VISIT EST AGE 40-64: CPT | Mod: S$GLB,,, | Performed by: NURSE PRACTITIONER

## 2023-02-22 PROCEDURE — 99999 PR PBB SHADOW E&M-EST. PATIENT-LVL III: CPT | Mod: PBBFAC,,, | Performed by: NURSE PRACTITIONER

## 2023-02-22 PROCEDURE — 99999 PR PBB SHADOW E&M-EST. PATIENT-LVL III: ICD-10-PCS | Mod: PBBFAC,,, | Performed by: NURSE PRACTITIONER

## 2023-02-22 PROCEDURE — 3080F DIAST BP >= 90 MM HG: CPT | Mod: CPTII,S$GLB,, | Performed by: NURSE PRACTITIONER

## 2023-02-22 PROCEDURE — 3074F SYST BP LT 130 MM HG: CPT | Mod: CPTII,S$GLB,, | Performed by: NURSE PRACTITIONER

## 2023-02-22 PROCEDURE — 1160F RVW MEDS BY RX/DR IN RCRD: CPT | Mod: CPTII,S$GLB,, | Performed by: NURSE PRACTITIONER

## 2023-02-22 PROCEDURE — 1160F PR REVIEW ALL MEDS BY PRESCRIBER/CLIN PHARMACIST DOCUMENTED: ICD-10-PCS | Mod: CPTII,S$GLB,, | Performed by: NURSE PRACTITIONER

## 2023-02-22 PROCEDURE — 1159F MED LIST DOCD IN RCRD: CPT | Mod: CPTII,S$GLB,, | Performed by: NURSE PRACTITIONER

## 2023-02-22 PROCEDURE — 3080F PR MOST RECENT DIASTOLIC BLOOD PRESSURE >= 90 MM HG: ICD-10-PCS | Mod: CPTII,S$GLB,, | Performed by: NURSE PRACTITIONER

## 2023-02-22 PROCEDURE — 3008F BODY MASS INDEX DOCD: CPT | Mod: CPTII,S$GLB,, | Performed by: NURSE PRACTITIONER

## 2023-02-22 PROCEDURE — 1159F PR MEDICATION LIST DOCUMENTED IN MEDICAL RECORD: ICD-10-PCS | Mod: CPTII,S$GLB,, | Performed by: NURSE PRACTITIONER

## 2023-02-22 PROCEDURE — 3074F PR MOST RECENT SYSTOLIC BLOOD PRESSURE < 130 MM HG: ICD-10-PCS | Mod: CPTII,S$GLB,, | Performed by: NURSE PRACTITIONER

## 2023-02-22 RX ORDER — NORETHINDRONE ACETATE AND ETHINYL ESTRADIOL AND FERROUS FUMARATE 1MG-20(24)
1 KIT ORAL DAILY
Qty: 30 TABLET | Refills: 11 | Status: SHIPPED | OUTPATIENT
Start: 2023-02-22 | End: 2024-02-27 | Stop reason: SDUPTHER

## 2023-02-22 NOTE — PROGRESS NOTES
Subjective:       Patient ID: Mariann Arias is a 42 y.o. female.    Chief Complaint:  Gynecologic Exam    No LMP recorded. (Menstrual status: Birth Control).  History of Present Illness  Annual Exam-Premenopausal  Patient presents for annual exam. The patient has no complaints today. The patient is not sexually active. GYN screening history: last pap: approximate date 10/21/21 and was normal. The patient wears seatbelts: yes. The patient participates in regular exercise: yes. Has the patient ever been transfused or tattooed?: no. The patient reports that there is not domestic violence in her life. Currently on OCPs, doing well.  Requesting refill today    OB History    Para Term  AB Living   0 0 0 0 0 0   SAB IAB Ectopic Multiple Live Births   0 0 0 0 0       Review of Systems  Review of Systems   Constitutional:  Negative for appetite change, fatigue, fever and unexpected weight change.   Eyes:  Negative for visual disturbance.   Cardiovascular:  Negative for chest pain.   Gastrointestinal:  Negative for abdominal pain, bloating, constipation, diarrhea, nausea and vomiting.   Genitourinary:  Negative for bladder incontinence, dysmenorrhea, dyspareunia, dysuria, flank pain, frequency, genital sores, menorrhagia, menstrual problem, pelvic pain, urgency, vaginal bleeding, vaginal discharge, vaginal pain, postcoital bleeding, vaginal dryness and vaginal odor.   Integumentary:  Negative for rash, acne, mole/lesion, breast mass, nipple discharge, breast skin changes and breast tenderness.   Neurological:  Negative for syncope and headaches.   Hematological:  Negative for adenopathy. Does not bruise/bleed easily.   All other systems reviewed and are negative.  Breast: Positive for breast self exam.Negative for asymmetry, lump, mass, nipple discharge, skin changes and tenderness         Objective:      Physical Exam:   Constitutional: She is oriented to person, place, and time. She appears well-developed and  well-nourished.    HENT:   Head: Normocephalic and atraumatic.    Eyes: Pupils are equal, round, and reactive to light. Conjunctivae and EOM are normal.     Cardiovascular:  Normal rate and regular rhythm.             Pulmonary/Chest: Effort normal. Right breast exhibits no inverted nipple, no mass, no nipple discharge, no skin change, no tenderness, no bleeding and no swelling. Left breast exhibits no inverted nipple, no mass, no nipple discharge, no skin change, no tenderness, no bleeding and no swelling. Breasts are symmetrical.        Abdominal: Soft. Hernia confirmed negative in the right inguinal area and confirmed negative in the left inguinal area.     Genitourinary:    Inguinal canal, vagina, uterus, right adnexa, left adnexa and rectum normal.      Pelvic exam was performed with patient supine.   The external female genitalia was normal.   Genitalia hair distrobution normal .   Labial bartholins normal.There is no rash, tenderness, lesion or injury on the right labia. There is no rash, tenderness, lesion or injury on the left labia. Cervix is normal. No erythema,  no vaginal discharge, bleeding, rectocele, cystocele or unspecified prolapse of vaginal walls in the vagina. Cervix exhibits no motion tenderness and no friability. Uterus is not tender.           Musculoskeletal: Normal range of motion and moves all extremeties.      Lymphadenopathy: No inguinal adenopathy noted on the right or left side.    Neurological: She is alert and oriented to person, place, and time.    Skin: Skin is warm and dry. No rash noted. No erythema.    Psychiatric: She has a normal mood and affect. Her behavior is normal. Judgment and thought content normal.          Assessment:     1. Well woman exam with routine gynecological exam    2. Encounter for screening mammogram for malignant neoplasm of breast    3. Encounter for surveillance of contraceptive pills              Plan:   Mariann was seen today for gynecologic  exam.    Diagnoses and all orders for this visit:    Well woman exam with routine gynecological exam    Encounter for screening mammogram for malignant neoplasm of breast  -     Mammo Digital Screening Bilat w/ Jesu; Future    Encounter for surveillance of contraceptive pills  -     JUNEL FE 24 1 mg-20 mcg (24)/75 mg (4) per tablet; Take 1 tablet by mouth once daily. TK 1 T PO QD      Follow up with me in 1 year for annual well woman exam.

## 2024-02-27 ENCOUNTER — TELEPHONE (OUTPATIENT)
Dept: OBSTETRICS AND GYNECOLOGY | Facility: CLINIC | Age: 44
End: 2024-02-27
Payer: COMMERCIAL

## 2024-02-27 DIAGNOSIS — Z30.41 ENCOUNTER FOR SURVEILLANCE OF CONTRACEPTIVE PILLS: ICD-10-CM

## 2024-02-27 RX ORDER — NORETHINDRONE ACETATE AND ETHINYL ESTRADIOL AND FERROUS FUMARATE 1MG-20(24)
1 KIT ORAL DAILY
Qty: 28 TABLET | Refills: 0 | Status: SHIPPED | OUTPATIENT
Start: 2024-02-27 | End: 2024-03-26 | Stop reason: SDUPTHER

## 2024-02-27 NOTE — TELEPHONE ENCOUNTER
----- Message from Khloe Terrazas sent at 2/27/2024 11:42 AM CST -----  Contact: Mariann  Type:  RX Refill Request    Who Called: Mariann  Refill or New Rx:New Rx   RX Name and Strength:JUNEL FE 24 1 mg-20 mcg (24)/75 mg (4) per tablet   How is the patient currently taking it? (ex. 1XDay):as prescribed  Is this a 30 day or 90 day RX:30  Preferred Pharmacy with phone number:  [id number] - [place] - [address] [address] [facility]  [address]  [facility] [address]  Phone: [phone] Fax: [phone]  Local or Mail Order:local  Ordering Provider:Chrissie Banks  Would the patient rather a call back or a response via [facility]? call  Best Call Back Number:[phone]   Additional Information: Patient reports almost out of medication and request refill of prescription prior to visit.   Thank you,  GH

## 2024-03-15 ENCOUNTER — OFFICE VISIT (OUTPATIENT)
Dept: URGENT CARE | Facility: CLINIC | Age: 44
End: 2024-03-15
Payer: COMMERCIAL

## 2024-03-15 VITALS
BODY MASS INDEX: 37.73 KG/M2 | RESPIRATION RATE: 16 BRPM | HEART RATE: 93 BPM | SYSTOLIC BLOOD PRESSURE: 120 MMHG | OXYGEN SATURATION: 98 % | DIASTOLIC BLOOD PRESSURE: 82 MMHG | TEMPERATURE: 98 F | WEIGHT: 205 LBS | HEIGHT: 62 IN

## 2024-03-15 DIAGNOSIS — T78.3XXA ANGIOEDEMA, INITIAL ENCOUNTER: Primary | ICD-10-CM

## 2024-03-15 DIAGNOSIS — R60.0 FACIAL EDEMA: ICD-10-CM

## 2024-03-15 DIAGNOSIS — T78.40XA ALLERGIC REACTION, INITIAL ENCOUNTER: ICD-10-CM

## 2024-03-15 PROCEDURE — 96372 THER/PROPH/DIAG INJ SC/IM: CPT | Mod: S$GLB,,, | Performed by: NURSE PRACTITIONER

## 2024-03-15 PROCEDURE — 99203 OFFICE O/P NEW LOW 30 MIN: CPT | Mod: 25,S$GLB,, | Performed by: NURSE PRACTITIONER

## 2024-03-15 RX ORDER — DEXAMETHASONE SODIUM PHOSPHATE 100 MG/10ML
10 INJECTION INTRAMUSCULAR; INTRAVENOUS
Status: COMPLETED | OUTPATIENT
Start: 2024-03-15 | End: 2024-03-15

## 2024-03-15 RX ADMIN — DEXAMETHASONE SODIUM PHOSPHATE 10 MG: 100 INJECTION INTRAMUSCULAR; INTRAVENOUS at 04:03

## 2024-03-15 NOTE — PROGRESS NOTES
"Subjective:      Patient ID: Mariann Arias is a 43 y.o. female.    Vitals:  height is 5' 2" (1.575 m) and weight is 93 kg (205 lb). Her oral temperature is 98.4 °F (36.9 °C). Her blood pressure is 120/82 and her pulse is 93. Her respiration is 16 and oxygen saturation is 98%.     Chief Complaint: Facial Swelling (Fatigue/)    Pt presents to the clinic today with facial and neck edema and fatigue that all began this morning.    Edema  This is a new problem. The current episode started today. The problem occurs constantly. The problem has been rapidly worsening. Associated symptoms include fatigue and swollen glands. Pertinent negatives include no abdominal pain, anorexia, arthralgias, change in bowel habit, chest pain, chills, congestion, coughing, diaphoresis, fever, headaches, joint swelling, myalgias, nausea, neck pain, numbness, rash, sore throat, urinary symptoms, vertigo, visual change, vomiting or weakness. Exacerbated by: touching  the face or jaw. She has tried NSAIDs for the symptoms.       Constitution: Positive for fatigue. Negative for chills, sweating and fever.   HENT:  Negative for congestion and sore throat.    Neck: Negative for neck pain.   Cardiovascular:  Negative for chest pain.   Respiratory:  Negative for cough.    Gastrointestinal:  Negative for abdominal pain, nausea and vomiting.   Musculoskeletal:  Negative for joint pain, joint swelling and muscle ache.   Skin:  Negative for rash.   Neurological:  Negative for history of vertigo, headaches and numbness.      Objective:     Physical Exam   Constitutional: She is oriented to person, place, and time. She appears well-developed. She is cooperative. No distress.   HENT:   Head: Normocephalic and atraumatic.       Ears:   Right Ear: Hearing, tympanic membrane, external ear and ear canal normal.   Left Ear: Hearing, tympanic membrane, external ear and ear canal normal.   Nose: Nose normal. No mucosal edema, rhinorrhea or nasal deformity. No " epistaxis. Right sinus exhibits no maxillary sinus tenderness and no frontal sinus tenderness. Left sinus exhibits no maxillary sinus tenderness and no frontal sinus tenderness.   Mouth/Throat: Uvula is midline and mucous membranes are normal. Mucous membranes are moist. No trismus in the jaw. Normal dentition. No uvula swelling. Posterior oropharyngeal erythema present. No oropharyngeal exudate or posterior oropharyngeal edema. Oropharynx is clear.   Facial edema      Comments: Facial edema  Eyes: Conjunctivae and lids are normal. Pupils are equal, round, and reactive to light. Extraocular movement intact   Neck: Trachea normal and phonation normal. Neck supple. No neck rigidity present.   Cardiovascular: Normal rate, regular rhythm, normal heart sounds and normal pulses.   Pulmonary/Chest: Effort normal and breath sounds normal. No respiratory distress. She has no wheezes.   Abdominal: Normal appearance and bowel sounds are normal. Soft.   Musculoskeletal: Normal range of motion.         General: Normal range of motion.      Cervical back: She exhibits no tenderness.   Lymphadenopathy:        Head (right side): Submental, submandibular and tonsillar adenopathy present.        Head (left side): Submental, submandibular and tonsillar adenopathy present.   Right upper body: Axillary adenopathy present.   Left upper body: Axillary adenopathy present.  Neurological: no focal deficit. She is alert, oriented to person, place, and time and at baseline. She exhibits normal muscle tone.   Skin: Skin is warm, dry and intact. Capillary refill takes less than 2 seconds.   Psychiatric: Her speech is normal and behavior is normal. Judgment and thought content normal.   Nursing note and vitals reviewed.      Assessment:     1. Angioedema, initial encounter    2. Facial edema    3. Allergic reaction, initial encounter        Plan:   Patient to take 25 mg benadryl once she has gotten home, followed by zyrtec, Claritin, or similar  medication daily.  GO TO EMERGENCY DEPARTMENT IMMEDIATELY IF YOU EXPERIENCE SWELLING OF THE THROAT, LIPS, OR HAVE DIFFICULTY BREATHING OR SHORTNESS OF BREATH.    Angioedema, initial encounter    Facial edema  -     dexAMETHasone injection 10 mg    Allergic reaction, initial encounter        What care is needed at home?   Ask your doctor what you need to do when you go home. Make sure you ask questions if you do not understand what the doctor says. This way you will know what you need to do.  You doctor will give you drugs for pain and swelling. Take all the drugs as ordered by your doctor.  During an attack, loosen tight clothing.  If you are itchy, you may put a cool damp washcloth over the itchy part. Place an ice pack or a bag of frozen peas wrapped in a towel over the itchy part. Never put ice right on the skin. Do not leave the ice on more than 10 to 15 minutes at a time.  If your angioedema is caused by heat, stay in a cool room with good air flow.  Put an air purifier in your room. This may help filter the air in your room.  Keep a list of what sets off your allergies. Stay away from them as much as possible.  Wear a mask when you need to go to [place] and other crowded places if your trigger is airborne allergens.  When eating in a restaurant, always ask about the ingredients of a dish you want to have if your trigger for angioedema is food-related (like MSG).  Tell close friends and companions about your condition so that they may help you if needed.  The drugs may make you feel tired. Do not drive or operate machinery if you feel drowsy.

## 2024-03-15 NOTE — PATIENT INSTRUCTIONS
GO TO EMERGENCY DEPARTMENT IMMEDIATELY IF YOU EXPERIENCE SWELLING OF THE THROAT, LIPS, OR HAVE DIFFICULTY BREATHING OR SHORTNESS OF BREATH.  Patient to take 25 mg benadryl once she has gotten home, followed by zyrtec, Claritin, or similar medication daily.      What care is needed at home?   Ask your doctor what you need to do when you go home. Make sure you ask questions if you do not understand what the doctor says. This way you will know what you need to do.  You doctor will give you drugs for pain and swelling. Take all the drugs as ordered by your doctor.  During an attack, loosen tight clothing.  If you are itchy, you may put a cool damp washcloth over the itchy part. Place an ice pack or a bag of frozen peas wrapped in a towel over the itchy part. Never put ice right on the skin. Do not leave the ice on more than 10 to 15 minutes at a time.  If your angioedema is caused by heat, stay in a cool room with good air flow.  Put an air purifier in your room. This may help filter the air in your room.  Keep a list of what sets off your allergies. Stay away from them as much as possible.  Wear a mask when you need to go to [place] and other crowded places if your trigger is airborne allergens.  When eating in a restaurant, always ask about the ingredients of a dish you want to have if your trigger for angioedema is food-related (like MSG).  Tell close friends and companions about your condition so that they may help you if needed.  The drugs may make you feel tired. Do not drive or operate machinery if you feel drowsy.    Please arrange follow up with your primary medical clinic as soon as possible. You must understand that you've received an Urgent Care treatment only and that you may be released before all of your medical problems are known or treated. You, the patient, will arrange for follow up as instructed. If your symptoms worsen or fail to improve you should go to the Emergency Room.         Go to the Emergency  Department for any new or worsening symptoms including: worsening abdominal pain, dark\black\bloody bowel movements, vomiting blood, hard abdomen, fever, chest pain, shortness of breath, loss of consciousness or any other concerns.

## 2024-03-26 ENCOUNTER — TELEPHONE (OUTPATIENT)
Dept: OBSTETRICS AND GYNECOLOGY | Facility: CLINIC | Age: 44
End: 2024-03-26
Payer: COMMERCIAL

## 2024-03-26 DIAGNOSIS — Z30.41 ENCOUNTER FOR SURVEILLANCE OF CONTRACEPTIVE PILLS: ICD-10-CM

## 2024-03-26 RX ORDER — NORETHINDRONE ACETATE AND ETHINYL ESTRADIOL AND FERROUS FUMARATE 1MG-20(24)
1 KIT ORAL DAILY
Qty: 28 TABLET | Refills: 0 | Status: SHIPPED | OUTPATIENT
Start: 2024-03-26 | End: 2024-04-23

## 2024-03-26 NOTE — TELEPHONE ENCOUNTER
Patient contacted clinic in regards to rescheduling visit for Annual Exam today at [facility] for 7:30 due to car trouble. Patient states she will be going out of town as well on Friday and will need the visit scheduled for the week after next. [facility] as [facility] her home, for Annual Exam in clinic with NP. Patient states she would like to also see if she could get 1 more additional refill of BC until scheduled Annual. Advised patient I would forward message to provider to see if 1 more additional refill could be sent, and advised since she already had 1 sent last month they may have her come in to complete Annual first before another refill. Patient voiced understanding.

## 2025-04-21 ENCOUNTER — TELEPHONE (OUTPATIENT)
Dept: HEMATOLOGY/ONCOLOGY | Facility: CLINIC | Age: 45
End: 2025-04-21
Payer: COMMERCIAL

## 2025-04-21 NOTE — TELEPHONE ENCOUNTER
Lvm for a return call in reference to Hem/Onc appt scheduled on 5/7 has been canceled and will need to be r/s d/t being scheduled incorrectly on Onc schedule. [employer]oner message sent.       Cyclophosphamide Counseling:  I discussed with the patient the risks of cyclophosphamide including but not limited to hair loss, hormonal abnormalities, decreased fertility, abdominal pain, diarrhea, nausea and vomiting, bone marrow suppression and infection. The patient understands that monitoring is required while taking this medication.

## 2025-05-08 ENCOUNTER — PATIENT MESSAGE (OUTPATIENT)
Dept: RESEARCH | Facility: HOSPITAL | Age: 45
End: 2025-05-08
Payer: COMMERCIAL

## 2025-05-09 ENCOUNTER — TELEPHONE (OUTPATIENT)
Dept: HEMATOLOGY/ONCOLOGY | Facility: CLINIC | Age: 45
End: 2025-05-09
Payer: COMMERCIAL

## 2025-05-09 NOTE — TELEPHONE ENCOUNTER
----- Message from Nurse Wood sent at 4/21/2025  3:19 PM CDT -----  Reminder to get labs appt 5/19

## 2025-05-12 ENCOUNTER — TELEPHONE (OUTPATIENT)
Dept: HEMATOLOGY/ONCOLOGY | Facility: CLINIC | Age: 45
End: 2025-05-12
Payer: COMMERCIAL

## 2025-05-12 NOTE — TELEPHONE ENCOUNTER
Spoke to pt informed lab results received via fax but copies are too dark and unable to read clearly asked to resend. Pt v/u. Will await results.

## 2025-05-12 NOTE — TELEPHONE ENCOUNTER
----- Message from Nurse Wood sent at 5/9/2025  8:50 AM CDT -----    ----- Message -----  From: Nina Wolff LPN  Sent: 5/5/2025   1:00 AM CDT  To: Nina Wolff LPN    Reminder to get labs appt 5/19

## 2025-05-19 ENCOUNTER — OFFICE VISIT (OUTPATIENT)
Dept: HEMATOLOGY/ONCOLOGY | Facility: CLINIC | Age: 45
End: 2025-05-19
Payer: COMMERCIAL

## 2025-05-19 DIAGNOSIS — Z12.31 ENCOUNTER FOR SCREENING MAMMOGRAM FOR MALIGNANT NEOPLASM OF BREAST: ICD-10-CM

## 2025-05-19 DIAGNOSIS — Z98.890 STATUS POST GASTRIC SURGERY: ICD-10-CM

## 2025-05-19 DIAGNOSIS — E53.8 B12 DEFICIENCY: ICD-10-CM

## 2025-05-19 DIAGNOSIS — Z12.4 CERVICAL CANCER SCREENING: ICD-10-CM

## 2025-05-19 DIAGNOSIS — D50.9 IRON DEFICIENCY ANEMIA, UNSPECIFIED IRON DEFICIENCY ANEMIA TYPE: Primary | ICD-10-CM

## 2025-05-19 DIAGNOSIS — D50.9 MICROCYTIC ANEMIA: ICD-10-CM

## 2025-05-19 NOTE — PROGRESS NOTES
The patient location is: home  The chief complaint leading to consultation is: fatigue    Visit type: audiovisual    Face to Face time with patient: 45  60 minutes of total time spent on the encounter, which includes face to face time and non-face to face time preparing to see the patient (eg, review of tests), Obtaining and/or reviewing separately obtained history, Documenting clinical information in the electronic or other health record, Independently interpreting results (not separately reported) and communicating results to the patient/family/caregiver, or Care coordination (not separately reported).         Each patient to whom he or she provides medical services by telemedicine is:  (1) informed of the relationship between the physician and patient and the respective role of any other health care provider with respect to management of the patient; and (2) notified that he or she may decline to receive medical services by telemedicine and may withdraw from such care at any time.    Patient ID: Mariann Arias is a 45 y.o. female.    Chief Complaint: fatigue    HPI:  46 yo female presents to the clinic as a new patient  status post Binu-en-Y gastric bypass surgery in 2005.  Since then she has been on supplements including po iron without improvement and has required IV iron every couple years.  She was seen by Dr. Misty Mayers in 5/2020 and was given Feraheme infusion x 2.  She tolerated the infusion well. She has since been loss to f/u.       Outside labs reviewed in media and hgb 10.5 mvc 72., normal rbc.  Low sat iron and elevated tibc    Works as a nurse in the school system.    Denies cigarette, alcohol, or illicit drug use.    Denies f/c/ns or unintentional weight loss.Denies any known abnormal lymphadenopathy.    Family hx of cancer:  Paternal grandmother: cervical  Father: prostate cancer  I have reviewed all of the patient's relevant lab work available in the medical record and have utilized  this in my evaluation and management recommendations today.        Social History[1]    Family History   Problem Relation Name Age of Onset    Ovarian cancer Paternal Grandmother      Diabetes Maternal Grandmother      Hypertension Maternal Grandmother      Hypertension Maternal Grandfather      Diabetes Father      Hypertension Father      Anemia Mother         Past Surgical History:   Procedure Laterality Date    BACK SURGERY      GASTRIC BYPASS  2005    L4-S1 with a cage          Past Medical History:   Diagnosis Date    Anemia     Chronic back pain     Kidney stones        Review of Systems   Constitutional:  Positive for fatigue.        Corn bread craving   HENT: Negative.     Eyes:  Positive for visual disturbance.   Respiratory: Negative.     Cardiovascular: Negative.    Gastrointestinal: Negative.    Endocrine: Negative.    Genitourinary: Negative.    Musculoskeletal: Negative.    Skin: Negative.    Allergic/Immunologic: Negative.    Neurological: Negative.    Hematological: Negative.    Psychiatric/Behavioral: Negative.            Medication List with Changes/Refills   Current Medications    BUPRENORPHINE-NALOXONE (SUBOXONE) 8-2 MG FILM    DISSOLVE 2 FILMS UNDER THE TONGUE QD    BUPROPION (WELLBUTRIN XL) 300 MG 24 HR TABLET    Take 300 mg by mouth.    FLUOXETINE 40 MG CAPSULE    Take 40 mg by mouth.    GABAPENTIN (NEURONTIN) 800 MG TABLET    Take 800 mg by mouth.    IBUPROFEN (ADVIL,MOTRIN) 800 MG TABLET    Take 800 mg by mouth 3 (three) times daily.    JUNEL FE 24 1 MG-20 MCG (24)/75 MG (4) PER TABLET    Take 1 tablet by mouth once daily. TK 1 T PO QD    METHOCARBAMOL (ROBAXIN) 750 MG TAB    Take 750 mg by mouth.        Objective:   There were no vitals filed for this visit.    Physical Exam  Constitutional:       Appearance: Normal appearance.   Pulmonary:      Effort: Pulmonary effort is normal.   Neurological:      Mental Status: She is alert and oriented to person, place, and time.   Psychiatric:        "  Mood and Affect: Mood normal.         Behavior: Behavior normal.         Thought Content: Thought content normal.         Judgment: Judgment normal.         Assessment:     Problem List Items Addressed This Visit       MAIRA (iron deficiency anemia) - Primary    Relevant Orders    CBC Auto Differential    Ferritin    Iron and TIBC    Comprehensive Metabolic Panel    Status post gastric surgery    Relevant Orders    CBC Auto Differential    Ferritin    Iron and TIBC    Comprehensive Metabolic Panel     Other Visit Diagnoses         Microcytic anemia        Relevant Orders    CBC Auto Differential    Ferritin    Iron and TIBC    Comprehensive Metabolic Panel      B12 deficiency        Relevant Orders    CBC Auto Differential      Cervical cancer screening        Relevant Orders    Ambulatory referral/consult to Obstetrics / Gynecology      Encounter for screening mammogram for malignant neoplasm of breast        Relevant Orders    Ambulatory referral/consult to Obstetrics / Gynecology            Lab Results   Component Value Date    WBC 5.28 08/05/2020    RBC 4.30 08/05/2020    HGB 8.6 (L) 08/05/2020    HCT 30.1 (L) 08/05/2020    MCV 70 (L) 08/05/2020    MCH 20.0 (L) 08/05/2020    MCHC 28.6 (L) 08/05/2020    RDW 17.6 (H) 08/05/2020     08/05/2020    MPV 11.0 08/05/2020    GRAN 3.2 08/05/2020    GRAN 59.9 08/05/2020    LYMPH 1.5 08/05/2020    LYMPH 27.5 08/05/2020    MONO 0.5 08/05/2020    MONO 8.9 08/05/2020    EOS 0.1 08/05/2020    BASO 0.02 08/05/2020    EOSINOPHIL 2.7 08/05/2020    BASOPHIL 0.4 08/05/2020      No results found for: "NA", "K", "CL", "CO2", "BUN", "CREATININE", "CALCIUM", "ANIONGAP", "ESTGFRAFRICA", "EGFRNONAA"  No results found for: "ALT", "AST", "GGT", "ALKPHOS", "BILITOT"  Lab Results   Component Value Date    IRON 13 (L) 08/05/2020    TRANSFERRIN 377 (H) 08/05/2020    TIBC 558 (H) 08/05/2020    FESATURATED 2 (L) 08/05/2020    FERRITIN 4 (L) 08/05/2020      See outside labs in Media  Plan: "   Iron deficiency anemia, unspecified iron deficiency anemia type  -     CBC Auto Differential; Future; Expected date: 05/19/2025  -     Ferritin; Future; Expected date: 05/19/2025  -     Iron and TIBC; Future; Expected date: 05/19/2025  -     Comprehensive Metabolic Panel; Future; Expected date: 05/19/2025    Status post gastric surgery  -     CBC Auto Differential; Future; Expected date: 05/19/2025  -     Ferritin; Future; Expected date: 05/19/2025  -     Iron and TIBC; Future; Expected date: 05/19/2025  -     Comprehensive Metabolic Panel; Future; Expected date: 05/19/2025    Microcytic anemia  -     CBC Auto Differential; Future; Expected date: 05/19/2025  -     Ferritin; Future; Expected date: 05/19/2025  -     Iron and TIBC; Future; Expected date: 05/19/2025  -     Comprehensive Metabolic Panel; Future; Expected date: 05/19/2025    B12 deficiency  -     CBC Auto Differential; Future; Expected date: 05/19/2025    Cervical cancer screening  -     Ambulatory referral/consult to Obstetrics / Gynecology; Future; Expected date: 05/26/2025    Encounter for screening mammogram for malignant neoplasm of breast  -     Ambulatory referral/consult to Obstetrics / Gynecology; Future; Expected date: 05/26/2025      Med Onc Chart Routing      Follow up with physician    Follow up with [facility] . F/u 6 weeks after last dose of feraheme with labs prior - VV   Infusion scheduling note   please call patient to discuss scheduled - Visually impaired - schedule feraheme infusiosn 510 mg IV x 2 weekly doses at the [facility]   Injection scheduling note n/a   Labs   Scheduling:  Preferred lab: [facility]  Lab interval:  cbc, bmp, iron studies   Imaging   N/a   Pharmacy appointment No pharmacy appointment needed      Other referrals No nutrition appointment needed -        No additional referrals needed  n/a          Start B12 1000 -2000 SL daily.  Continue monthly B12 self injection.  Will give Feraheme infusions x 2 with labs 6 weeks  after last dose and a VV after to review results.     Collaborating Provider:  Dr. Mallory Guzman MD    Thank You,  Leatha Mcdowell NP  Benign Hematology         [1]   Social History  Socioeconomic History    Marital status: Single   Tobacco Use    Smoking status: Never    Smokeless tobacco: Never   Substance and Sexual Activity    Alcohol use: Not Currently    Drug use: Never    Sexual activity: Not Currently     Partners: Male   Social History Narrative    ** Merged History Encounter **          Social Drivers of Health     Financial Resource Strain: High Risk (5/12/2025)    Overall Financial Resource Strain (CARDIA)     Difficulty of Paying Living Expenses: Hard   Food Insecurity: No Food Insecurity (5/12/2025)    Hunger Vital Sign     Worried About Running Out of Food in the Last Year: Never true     Ran Out of Food in the Last Year: Never true   Transportation Needs: No Transportation Needs (5/12/2025)    PRAPARE - Transportation     Lack of Transportation (Medical): No     Lack of Transportation (Non-Medical): No   Physical Activity: Sufficiently Active (5/12/2025)    Exercise Vital Sign     Days of Exercise per Week: 5 days     Minutes of Exercise per Session: 30 min   Stress: Stress Concern Present (5/12/2025)    [employer] - Occupational Stress Questionnaire     Feeling of Stress : Rather much   Housing Stability: Low Risk  (5/12/2025)    Housing Stability Vital Sign     Unable to Pay for Housing in the Last Year: No     Number of Times Moved in the Last Year: 0     Homeless in the Last Year: No

## 2025-05-27 ENCOUNTER — TELEPHONE (OUTPATIENT)
Dept: INFUSION THERAPY | Facility: HOSPITAL | Age: 45
End: 2025-05-27
Payer: COMMERCIAL

## 2025-05-30 ENCOUNTER — INFUSION (OUTPATIENT)
Dept: INFUSION THERAPY | Facility: HOSPITAL | Age: 45
End: 2025-05-30
Attending: NURSE PRACTITIONER
Payer: COMMERCIAL

## 2025-05-30 VITALS
SYSTOLIC BLOOD PRESSURE: 116 MMHG | HEART RATE: 75 BPM | RESPIRATION RATE: 16 BRPM | OXYGEN SATURATION: 99 % | TEMPERATURE: 98 F | DIASTOLIC BLOOD PRESSURE: 80 MMHG

## 2025-05-30 DIAGNOSIS — Z98.890 STATUS POST GASTRIC SURGERY: ICD-10-CM

## 2025-05-30 DIAGNOSIS — D50.9 IRON DEFICIENCY ANEMIA, UNSPECIFIED IRON DEFICIENCY ANEMIA TYPE: Primary | ICD-10-CM

## 2025-05-30 PROCEDURE — 63600175 PHARM REV CODE 636 W HCPCS: Mod: JZ,TB | Performed by: NURSE PRACTITIONER

## 2025-05-30 PROCEDURE — 25000003 PHARM REV CODE 250: Performed by: NURSE PRACTITIONER

## 2025-05-30 PROCEDURE — 96374 THER/PROPH/DIAG INJ IV PUSH: CPT

## 2025-05-30 RX ORDER — SODIUM CHLORIDE 0.9 % (FLUSH) 0.9 %
10 SYRINGE (ML) INJECTION
OUTPATIENT
Start: 2025-06-06

## 2025-05-30 RX ORDER — SODIUM CHLORIDE 0.9 % (FLUSH) 0.9 %
10 SYRINGE (ML) INJECTION
Status: DISCONTINUED | OUTPATIENT
Start: 2025-05-30 | End: 2025-05-30 | Stop reason: HOSPADM

## 2025-05-30 RX ORDER — EPINEPHRINE 0.3 MG/.3ML
0.3 INJECTION SUBCUTANEOUS ONCE AS NEEDED
Status: DISCONTINUED | OUTPATIENT
Start: 2025-05-30 | End: 2025-05-30 | Stop reason: HOSPADM

## 2025-05-30 RX ORDER — EPINEPHRINE 0.3 MG/.3ML
0.3 INJECTION SUBCUTANEOUS ONCE AS NEEDED
OUTPATIENT
Start: 2025-06-06

## 2025-05-30 RX ORDER — EPINEPHRINE 0.3 MG/.3ML
0.3 INJECTION SUBCUTANEOUS ONCE AS NEEDED
Status: CANCELLED | OUTPATIENT
Start: 2025-05-30

## 2025-05-30 RX ORDER — SODIUM CHLORIDE 0.9 % (FLUSH) 0.9 %
10 SYRINGE (ML) INJECTION
Status: CANCELLED | OUTPATIENT
Start: 2025-05-30

## 2025-05-30 RX ADMIN — FERUMOXYTOL 510 MG: 510 INJECTION INTRAVENOUS at 03:05

## 2025-05-30 NOTE — DISCHARGE INSTRUCTIONS
[facility]  [id number] [address]  [address]  [phone] phone     [phone] fax  Hours of Operation: Monday- Friday 8:00am- 5:00pm  After hours phone  [phone]  Hematology / Oncology Physicians on call      SUAD Rosario Dr., NP Phaon Dunbar, NP Khelsea Conley, FNP    Please call with any concerns regarding your appointment today.

## 2025-05-30 NOTE — PLAN OF CARE
Problem: Adult Inpatient Plan of Care  Goal: Plan of Care Review  Outcome: Progressing  Flowsheets (Taken 5/30/2025 1544)  Plan of Care Reviewed With: patient  Goal: Patient-Specific Goal (Individualized)  Outcome: Progressing  Flowsheets (Taken 5/30/2025 1544)  Individualized Care Needs: pt likes feet up, blanket and gingerale  Anxieties, Fears or Concerns: pt says she has been tired alot  Patient/Family-Specific Goals (Include Timeframe): pt to tolerate iron infusion without reaction     Problem: Anemia  Goal: Anemia Symptom Improvement  Outcome: Progressing

## 2025-06-19 ENCOUNTER — TELEPHONE (OUTPATIENT)
Dept: INFUSION THERAPY | Facility: HOSPITAL | Age: 45
End: 2025-06-19
Payer: COMMERCIAL

## 2025-06-23 ENCOUNTER — TELEPHONE (OUTPATIENT)
Dept: INFUSION THERAPY | Facility: HOSPITAL | Age: 45
End: 2025-06-23
Payer: COMMERCIAL

## 2025-06-24 ENCOUNTER — HOSPITAL ENCOUNTER (OUTPATIENT)
Dept: RADIOLOGY | Facility: HOSPITAL | Age: 45
Discharge: HOME | End: 2025-06-24
Attending: PHYSICIAN ASSISTANT
Payer: COMMERCIAL

## 2025-06-24 ENCOUNTER — OFFICE VISIT (OUTPATIENT)
Dept: ORTHOPEDICS | Facility: CLINIC | Age: 45
End: 2025-06-24
Payer: COMMERCIAL

## 2025-06-24 DIAGNOSIS — G89.29 CHRONIC PAIN OF BOTH KNEES: ICD-10-CM

## 2025-06-24 DIAGNOSIS — M25.561 CHRONIC PAIN OF BOTH KNEES: ICD-10-CM

## 2025-06-24 DIAGNOSIS — G89.29 CHRONIC PAIN OF BOTH KNEES: Primary | ICD-10-CM

## 2025-06-24 DIAGNOSIS — M25.562 ACUTE PAIN OF LEFT KNEE: Primary | ICD-10-CM

## 2025-06-24 DIAGNOSIS — M25.561 CHRONIC PAIN OF BOTH KNEES: Primary | ICD-10-CM

## 2025-06-24 DIAGNOSIS — M25.562 CHRONIC PAIN OF BOTH KNEES: Primary | ICD-10-CM

## 2025-06-24 DIAGNOSIS — M25.562 CHRONIC PAIN OF BOTH KNEES: ICD-10-CM

## 2025-06-24 DIAGNOSIS — M25.462 EFFUSION, LEFT KNEE: ICD-10-CM

## 2025-06-24 PROCEDURE — 1160F RVW MEDS BY RX/DR IN RCRD: CPT | Mod: CPTII,S$GLB,, | Performed by: PHYSICIAN ASSISTANT

## 2025-06-24 PROCEDURE — 1159F MED LIST DOCD IN RCRD: CPT | Mod: CPTII,S$GLB,, | Performed by: PHYSICIAN ASSISTANT

## 2025-06-24 PROCEDURE — 99999 PR PBB SHADOW E&M-EST. PATIENT-LVL III: CPT | Mod: PBBFAC,,, | Performed by: PHYSICIAN ASSISTANT

## 2025-06-24 PROCEDURE — 99203 OFFICE O/P NEW LOW 30 MIN: CPT | Mod: S$GLB,,, | Performed by: PHYSICIAN ASSISTANT

## 2025-06-24 PROCEDURE — 73564 X-RAY EXAM KNEE 4 OR MORE: CPT | Mod: 26,,, | Performed by: STUDENT IN AN ORGANIZED HEALTH CARE EDUCATION/TRAINING PROGRAM

## 2025-06-24 PROCEDURE — 73564 X-RAY EXAM KNEE 4 OR MORE: CPT | Mod: TC,50,PO

## 2025-06-24 RX ORDER — NABUMENTONE 750 MG/1
750 TABLET ORAL 2 TIMES DAILY PRN
Qty: 60 TABLET | Refills: 0 | Status: SHIPPED | OUTPATIENT
Start: 2025-06-24 | End: 2025-07-24

## 2025-06-24 RX ORDER — SPIRONOLACTONE 50 MG/1
TABLET, FILM COATED ORAL
COMMUNITY
Start: 2024-10-12

## 2025-06-24 NOTE — PROGRESS NOTES
Subjective:       Patient ID: Mariann Arias is a 45 y.o. female.    Chief Complaint: Pain and Swelling of the Left Knee (Left knee pain )      HPI:   is a pleasant 45-year-old female chief complaint left knee pain and swelling.  She denies any specific injury or trauma.  She states that she was walking her dog on yesterday and shortly thereafter her left knee began to swell and become really painful.  She does have a history wanting this left knee and she has had an ACL repair.  She has taken ibuprofen for discomfort on a regular basis but on only helped to a small degree.  She does have mechanical symptoms her knee is locking and catching.  She also has a fair amount of swelling when compared to the contralateral knee.    Review of patient's allergies indicates:  No Known Allergies    Review of Systems   Constitutional:  Negative for chills, fatigue and fever.   Musculoskeletal:  Positive for arthralgias, gait problem and joint swelling. Negative for back pain, myalgias, neck pain and neck stiffness.   Skin:  Negative for color change, pallor, rash and wound.   Neurological:  Negative for weakness and numbness.         Medical History: The patient's past medical history, surgical history, social history, family history, medications, allergies, and 10-point review of systems were all reviewed and signed on the intake sheet. These were updated as necessary and placed in the electronic medical record.       Objective:     There were no vitals filed for this visit.    Physical Exam    GENERAL: Well appearing, in no acute distress.  HEAD: Normocephalic and atraumatic.  ENT: External ears and nose grossly normal.  EYES: EOMI bilaterally  PULMONARY: Respirations are grossly even and non-labored.  NEURO: Awake, alert, and oriented x 3.  SKIN: No obvious rashes appreciated.  PSYCH: Mood & affect are appropriate.    Detailed MSK exam:   Patient entered the back exam area the when a fairly significant  antalgic gait pattern without an assistive device.  She is sitting in the exam room in moderate distress secondary to discomfort but has a very pleasant demeanor.  Visual presentation does show a fair amount of swelling when compared to the contralateral knee.  No appreciation for erythema, ecchymosis or warmth.  Her active range of motion is significantly decreased at 10° to 70°.  She has a very positive Lucille's sign.  She was grossly neurovascularly intact throughout.    Imaging:  Seven views of bilateral knees were taken.  Right: Moderate tricompartmental joint space narrowing and hypertrophy worst in the medial compartment.  Including bony contour changes, osteophyte presentation and tibial spine beaking.  There was no appreciation for acute fracture.     Left: Moderate to severe tricompartmental joint space narrowing worst in the medial compartment with bony contour changes, the spine beaking and osteophyte presentation.  Patellar spurring.  Small joint effusion noted.  There was no appreciation for acute fracture.    Relevant imaging results were reviewed and interpreted by me and per my read as above.  This was discussed with the patient and / or family today.     Assessment:     1. Acute pain of left knee    2. Effusion, left knee          Plan:   1. Acute pain of left knee    2. Effusion, left knee             Plan:  1.  Did the to the patient's acuteness of injury as well as history with the left knee, with the physical exam presentation in the presence of fair amount of swelling we will proceed with MRI of the left knee to rule out meniscal derangement.    2. Rice protocol.  3. Patient will take nabumetone 750mg 1 tablet p.o. b.i.d. PRN for pain and inflammation.    4. We will see MRI results are available we will contact the patient and schedule an appointment to review the findings.    Follow up in about 4 weeks (around 7/22/2025).

## 2025-06-25 ENCOUNTER — INFUSION (OUTPATIENT)
Dept: INFUSION THERAPY | Facility: HOSPITAL | Age: 45
End: 2025-06-25
Attending: NURSE PRACTITIONER
Payer: COMMERCIAL

## 2025-06-25 VITALS
RESPIRATION RATE: 18 BRPM | SYSTOLIC BLOOD PRESSURE: 116 MMHG | HEART RATE: 75 BPM | DIASTOLIC BLOOD PRESSURE: 80 MMHG | TEMPERATURE: 97 F | OXYGEN SATURATION: 99 %

## 2025-06-25 DIAGNOSIS — D50.9 IRON DEFICIENCY ANEMIA, UNSPECIFIED IRON DEFICIENCY ANEMIA TYPE: Primary | ICD-10-CM

## 2025-06-25 DIAGNOSIS — Z98.890 STATUS POST GASTRIC SURGERY: ICD-10-CM

## 2025-06-25 PROCEDURE — 63600175 PHARM REV CODE 636 W HCPCS: Mod: JZ,TB | Performed by: NURSE PRACTITIONER

## 2025-06-25 PROCEDURE — 96374 THER/PROPH/DIAG INJ IV PUSH: CPT

## 2025-06-25 PROCEDURE — 25000003 PHARM REV CODE 250: Performed by: NURSE PRACTITIONER

## 2025-06-25 RX ORDER — SODIUM CHLORIDE 0.9 % (FLUSH) 0.9 %
10 SYRINGE (ML) INJECTION
OUTPATIENT
Start: 2025-06-25

## 2025-06-25 RX ORDER — EPINEPHRINE 0.3 MG/.3ML
0.3 INJECTION SUBCUTANEOUS ONCE AS NEEDED
OUTPATIENT
Start: 2025-06-25

## 2025-06-25 RX ADMIN — FERUMOXYTOL 510 MG: 510 INJECTION INTRAVENOUS at 03:06

## 2025-06-25 NOTE — PLAN OF CARE
"Pt is stable, pt administered Feraheme. Pt voiced she was doing "ok." Pt will follow up per provider. Plan of care reviewed with patient. Discussed if there are any new or ongoing concerns.        Problem: Adult Inpatient Plan of Care  Goal: Plan of Care Review  Outcome: Progressing  Goal: Patient-Specific Goal (Individualized)  Outcome: Progressing  Flowsheets (Taken 6/25/2025 1517)  Individualized Care Needs: Pt provided gingerale and blanket, legs elevated in reclined position.  Anxieties, Fears or Concerns: Pt c/o fatigue  Patient/Family-Specific Goals (Include Timeframe): Pt will toelrate Feraheme without adverse reaction.  Goal: Optimal Comfort and Wellbeing  Outcome: Progressing     Problem: Anemia  Goal: Anemia Symptom Improvement  Outcome: Progressing     Problem: Fall Injury Risk  Goal: Absence of Fall and Fall-Related Injury  Outcome: Progressing     "

## 2025-06-25 NOTE — DISCHARGE INSTRUCTIONS
Thank you for allowing me to care for you today,  BAUTISTA CordovaN, RN    [facility]  [address] [address]  [phone] phone     [phone] fax  Hours of Operation: Monday- Friday 7:00am- 5:30pm  After hours phone  [phone]  Hematology / Oncology Physicians on call      KARINA Aponte Dr., Dr., Dr., Dr., Dr., Dr., Dr., Dr., Dr., Dr., Dr., Dr., Dr., Dr., Dr., EMELINA Griffith, EMELINA Newell, EMELINA Mcdowell, NP  Please call with any concerns regarding your appointment today.   FALL PREVENTION   Falls often occur due to slipping, tripping or losing your balance. Here are ways to reduce your risk of falling again.   Was there anything that caused your fall that can be fixed, removed or replaced?   Make your home safe by keeping walkways clear of objects you may trip over.   Use non-slip pads under rugs.   Do not walk in poorly lit areas.   Do not stand on chairs or wobbly ladders.   Use caution when reaching overhead or looking upward. This position can cause a loss of balance.   Be sure your shoes fit properly, have non-slip bottoms and are in good condition.   Be cautious when going up and down stairs, curbs, and when walking on uneven sidewalks.   If your balance is poor, consider using a cane or walker.   If your fall was related to alcohol use, stop or limit alcohol intake.   If your fall was related to use of sleeping medicines, talk to your doctor about this. You may need to reduce your dosage at bedtime if you awaken during the night to go to the bathroom.   To reduce the need for nighttime bathroom trips:   Avoid drinking fluids for several hours before going to bed   Empty your bladder before going to bed   Men can keep a urinal at the bedside   © 5253-3950 [employer], [address] [place] [address], [place] [address]. All rights reserved. This information is not intended as a  substitute for professional medical care. Always follow your healthcare professional's instructions.

## 2025-07-01 ENCOUNTER — HOSPITAL ENCOUNTER (OUTPATIENT)
Dept: RADIOLOGY | Facility: HOSPITAL | Age: 45
Discharge: HOME | End: 2025-07-01
Attending: PHYSICIAN ASSISTANT
Payer: COMMERCIAL

## 2025-07-01 DIAGNOSIS — M25.562 ACUTE PAIN OF LEFT KNEE: ICD-10-CM

## 2025-07-01 PROCEDURE — 73721 MRI JNT OF LWR EXTRE W/O DYE: CPT | Mod: TC,PN,LT

## 2025-07-01 PROCEDURE — 73721 MRI JNT OF LWR EXTRE W/O DYE: CPT | Mod: 26,LT,, | Performed by: RADIOLOGY

## 2025-07-03 ENCOUNTER — OFFICE VISIT (OUTPATIENT)
Dept: ORTHOPEDICS | Facility: CLINIC | Age: 45
End: 2025-07-03
Payer: COMMERCIAL

## 2025-07-03 VITALS — HEIGHT: 62 IN | WEIGHT: 205 LBS | BODY MASS INDEX: 37.73 KG/M2

## 2025-07-03 DIAGNOSIS — M22.2X2 PATELLOFEMORAL SYNDROME OF LEFT KNEE: Primary | ICD-10-CM

## 2025-07-03 DIAGNOSIS — M17.12 OSTEOARTHRITIS OF LEFT KNEE, UNSPECIFIED OSTEOARTHRITIS TYPE: ICD-10-CM

## 2025-07-03 PROCEDURE — 99214 OFFICE O/P EST MOD 30 MIN: CPT | Mod: S$GLB,,, | Performed by: PHYSICIAN ASSISTANT

## 2025-07-03 PROCEDURE — 99999 PR PBB SHADOW E&M-EST. PATIENT-LVL IV: CPT | Mod: PBBFAC,,, | Performed by: PHYSICIAN ASSISTANT

## 2025-07-03 PROCEDURE — 1160F RVW MEDS BY RX/DR IN RCRD: CPT | Mod: CPTII,S$GLB,, | Performed by: PHYSICIAN ASSISTANT

## 2025-07-03 PROCEDURE — 3008F BODY MASS INDEX DOCD: CPT | Mod: CPTII,S$GLB,, | Performed by: PHYSICIAN ASSISTANT

## 2025-07-03 PROCEDURE — 1159F MED LIST DOCD IN RCRD: CPT | Mod: CPTII,S$GLB,, | Performed by: PHYSICIAN ASSISTANT

## 2025-07-03 RX ORDER — PREDNISONE 20 MG/1
20 TABLET ORAL DAILY
Qty: 7 TABLET | Refills: 0 | Status: SHIPPED | OUTPATIENT
Start: 2025-07-03 | End: 2025-07-10

## 2025-07-03 NOTE — PROGRESS NOTES
Subjective:       Patient ID: Mariann Arias is a 45 y.o. female.    Chief Complaint: Pain of the Left Knee      HPI:  Ms. Arias is here today for revisit.  She was last seen on 06/24/2025 and diagnosed with acute pain of the left knee and effusion of the left knee.  She has a significant history with this knee having already had a ACL repair.  In addition, she sustained an injury around 06/24/2025 while walking her dog.  That is when the significant pain and effusion started.  With the physical exam presentation as well as the history with her left knee we decided to proceed with the MRI to rule out internal derangement.  The MRI showed no re-tear of the ACL or meniscus just mainly advanced tricompartmental osteoarthritis with bulky spurring and advanced medial compartment chondromalacia. Less notable patellofemoral chondromalacia. Intra-articular loose body, 1.3 cm.  I did mentioned that the intra-articular loose body can cause issues and if it continues to become problematic I can refer her to 1 of our knee specialist Dr. Kang for an opinion on the best course of action having the loose body that is problematic.  For now, we will continue with a more conservative approach of physical therapy, continued anti-inflammatories and we will regroup in 4 weeks.    Review of patient's allergies indicates:  No Known Allergies    Review of Systems   Constitutional:  Negative for chills, fatigue and fever.   Musculoskeletal:  Positive for arthralgias and gait problem. Negative for back pain, joint swelling, myalgias, neck pain and neck stiffness.   Skin:  Negative for color change, pallor, rash and wound.   Neurological:  Negative for weakness and numbness.         Medical History: The patient's past medical history, surgical history, social history, family history, medications, allergies, and 10-point review of systems were all reviewed and signed on the intake sheet. These were updated as necessary and placed in  "the electronic medical record.       Objective:     Vitals:    07/03/25 1612   Weight: 93 kg (205 lb 0.4 oz)   Height: 5' 2" (1.575 m)       Physical Exam    GENERAL: Well appearing, in no acute distress.  HEAD: Normocephalic and atraumatic.  ENT: External ears and nose grossly normal.  EYES: EOMI bilaterally  PULMONARY: Respirations are grossly even and non-labored.  NEURO: Awake, alert, and oriented x 3.  SKIN: No obvious rashes appreciated.  PSYCH: Mood & affect are appropriate.    Detailed MSK exam:   Patient entered the back examined with a slight antalgic gait pattern and no assistive device. Sitting in exam room in no acute distress with a pleasant demeanor.  Visual presentation of the left knee was unremarkable no appreciated swelling, erythema, ecchymosis or warmth. Active range of motion was fairly well-preserved and 0-122 degrees. There was tenderness to palpation about the quad tendon mainly the interval of the suprapatellar and the insertion of the vastus medialis.  In addition, she had an equivocal patellar grind test as well as an equivocal patellar apprehension test.  Overall the knee does appear to be stable and she is grossly neurovascularly intact throughout.    Imaging:  No new images were taken on today's visit.    Relevant imaging results were reviewed and interpreted by me and per my read as above.  This was discussed with the patient and / or family today.     Assessment:     1. Patellofemoral syndrome of left knee    2. Osteoarthritis of left knee, unspecified osteoarthritis type          Plan:   1. Patellofemoral syndrome of left knee    2. Osteoarthritis of left knee, unspecified osteoarthritis type             Plan:  1.  Patient will return to clinic in 4 weeks for reassessment.    2. Patient will begin formal physical therapy at the [facility] location.  3. Patient will take ibuprofen as needed with the patient already has a active script for this medication.  We have added a 7 day " course of prednisone 20 mg 1 time q.a.m. for 7 days.  4. Ice to the left knee if needed.  5. Activities as tolerated and encouraged as not to exacerbate current status.    Follow up in about 4 weeks (around 7/31/2025).

## 2025-07-17 ENCOUNTER — TELEPHONE (OUTPATIENT)
Dept: ORTHOPEDICS | Facility: CLINIC | Age: 45
End: 2025-07-17
Payer: COMMERCIAL

## 2025-08-05 ENCOUNTER — LAB VISIT (OUTPATIENT)
Dept: LAB | Facility: HOSPITAL | Age: 45
End: 2025-08-05
Attending: NURSE PRACTITIONER
Payer: COMMERCIAL

## 2025-08-05 DIAGNOSIS — D50.9 IRON DEFICIENCY ANEMIA, UNSPECIFIED IRON DEFICIENCY ANEMIA TYPE: ICD-10-CM

## 2025-08-05 DIAGNOSIS — E53.8 B12 DEFICIENCY: ICD-10-CM

## 2025-08-05 DIAGNOSIS — Z98.890 STATUS POST GASTRIC SURGERY: ICD-10-CM

## 2025-08-05 DIAGNOSIS — D50.9 MICROCYTIC ANEMIA: ICD-10-CM

## 2025-08-05 LAB
ABSOLUTE EOSINOPHIL (OHS): 0.15 K/UL
ABSOLUTE MONOCYTE (OHS): 0.42 K/UL (ref 0.3–1)
ABSOLUTE NEUTROPHIL COUNT (OHS): 4.04 K/UL (ref 1.8–7.7)
ALBUMIN SERPL BCP-MCNC: 3.7 G/DL (ref 3.5–5.2)
ALP SERPL-CCNC: 92 UNIT/L (ref 40–150)
ALT SERPL W/O P-5'-P-CCNC: 13 UNIT/L (ref 10–44)
ANION GAP (OHS): 13 MMOL/L (ref 8–16)
AST SERPL-CCNC: 20 UNIT/L (ref 11–45)
BASOPHILS # BLD AUTO: 0.01 K/UL
BASOPHILS NFR BLD AUTO: 0.2 %
BILIRUB SERPL-MCNC: 0.2 MG/DL (ref 0.1–1)
BUN SERPL-MCNC: 16 MG/DL (ref 6–20)
CALCIUM SERPL-MCNC: 8.8 MG/DL (ref 8.7–10.5)
CHLORIDE SERPL-SCNC: 100 MMOL/L (ref 95–110)
CO2 SERPL-SCNC: 26 MMOL/L (ref 23–29)
CREAT SERPL-MCNC: 1.1 MG/DL (ref 0.5–1.4)
ERYTHROCYTE [DISTWIDTH] IN BLOOD BY AUTOMATED COUNT: 21 % (ref 11.5–14.5)
GFR SERPLBLD CREATININE-BSD FMLA CKD-EPI: >60 ML/MIN/1.73/M2
GLUCOSE SERPL-MCNC: 87 MG/DL (ref 70–110)
HCT VFR BLD AUTO: 39.4 % (ref 37–48.5)
HGB BLD-MCNC: 13.2 GM/DL (ref 12–16)
IMM GRANULOCYTES # BLD AUTO: 0.01 K/UL (ref 0–0.04)
IMM GRANULOCYTES NFR BLD AUTO: 0.2 % (ref 0–0.5)
LYMPHOCYTES # BLD AUTO: 1.15 K/UL (ref 1–4.8)
MCH RBC QN AUTO: 26.8 PG (ref 27–31)
MCHC RBC AUTO-ENTMCNC: 33.5 G/DL (ref 32–36)
MCV RBC AUTO: 80 FL (ref 82–98)
NUCLEATED RBC (/100WBC) (OHS): 0 /100 WBC
PLATELET # BLD AUTO: 276 K/UL (ref 150–450)
PMV BLD AUTO: 9.5 FL (ref 9.2–12.9)
POTASSIUM SERPL-SCNC: 4 MMOL/L (ref 3.5–5.1)
PROT SERPL-MCNC: 7 GM/DL (ref 6–8.4)
RBC # BLD AUTO: 4.92 M/UL (ref 4–5.4)
RELATIVE EOSINOPHIL (OHS): 2.6 %
RELATIVE LYMPHOCYTE (OHS): 19.9 % (ref 18–48)
RELATIVE MONOCYTE (OHS): 7.3 % (ref 4–15)
RELATIVE NEUTROPHIL (OHS): 69.8 % (ref 38–73)
SODIUM SERPL-SCNC: 139 MMOL/L (ref 136–145)
WBC # BLD AUTO: 5.78 K/UL (ref 3.9–12.7)

## 2025-08-05 PROCEDURE — 82728 ASSAY OF FERRITIN: CPT

## 2025-08-05 PROCEDURE — 36415 COLL VENOUS BLD VENIPUNCTURE: CPT

## 2025-08-05 PROCEDURE — 85025 COMPLETE CBC W/AUTO DIFF WBC: CPT

## 2025-08-05 PROCEDURE — 84132 ASSAY OF SERUM POTASSIUM: CPT

## 2025-08-05 PROCEDURE — 83540 ASSAY OF IRON: CPT

## 2025-08-06 LAB
FERRITIN SERPL-MCNC: 98 NG/ML (ref 20–300)
IRON SATN MFR SERPL: 12 % (ref 20–50)
IRON SERPL-MCNC: 46 UG/DL (ref 30–160)
TIBC SERPL-MCNC: 394 UG/DL (ref 250–450)
TRANSFERRIN SERPL-MCNC: 266 MG/DL (ref 200–375)

## 2025-08-11 ENCOUNTER — OFFICE VISIT (OUTPATIENT)
Dept: HEMATOLOGY/ONCOLOGY | Facility: CLINIC | Age: 45
End: 2025-08-11
Payer: COMMERCIAL

## 2025-08-11 DIAGNOSIS — D50.9 IRON DEFICIENCY ANEMIA, UNSPECIFIED IRON DEFICIENCY ANEMIA TYPE: Primary | ICD-10-CM

## 2025-08-11 DIAGNOSIS — R71.8 MICROCYTOSIS: ICD-10-CM

## 2025-08-11 DIAGNOSIS — D50.9 MICROCYTIC ANEMIA: ICD-10-CM

## 2025-08-11 DIAGNOSIS — E53.8 B12 DEFICIENCY: ICD-10-CM

## 2025-08-11 DIAGNOSIS — Z98.890 STATUS POST GASTRIC SURGERY: ICD-10-CM

## 2025-08-11 RX ORDER — PANTOPRAZOLE SODIUM 40 MG/1
40 TABLET, DELAYED RELEASE ORAL DAILY
COMMUNITY